# Patient Record
Sex: FEMALE | Race: WHITE | NOT HISPANIC OR LATINO
[De-identification: names, ages, dates, MRNs, and addresses within clinical notes are randomized per-mention and may not be internally consistent; named-entity substitution may affect disease eponyms.]

---

## 2018-05-02 ENCOUNTER — TRANSCRIPTION ENCOUNTER (OUTPATIENT)
Age: 78
End: 2018-05-02

## 2018-05-03 ENCOUNTER — OUTPATIENT (OUTPATIENT)
Dept: OUTPATIENT SERVICES | Facility: HOSPITAL | Age: 78
LOS: 1 days | End: 2018-05-03
Payer: MEDICARE

## 2018-05-03 ENCOUNTER — RESULT REVIEW (OUTPATIENT)
Age: 78
End: 2018-05-03

## 2018-05-03 DIAGNOSIS — R88.8 ABNORMAL FINDINGS IN OTHER BODY FLUIDS AND SUBSTANCES: ICD-10-CM

## 2018-05-03 PROCEDURE — 45380 COLONOSCOPY AND BIOPSY: CPT | Mod: PT

## 2018-05-03 PROCEDURE — 88305 TISSUE EXAM BY PATHOLOGIST: CPT | Mod: 26

## 2018-05-03 PROCEDURE — 88305 TISSUE EXAM BY PATHOLOGIST: CPT

## 2018-05-04 ENCOUNTER — TRANSCRIPTION ENCOUNTER (OUTPATIENT)
Age: 78
End: 2018-05-04

## 2018-05-04 LAB — SURGICAL PATHOLOGY FINAL REPORT - CH: SIGNIFICANT CHANGE UP

## 2022-07-08 ENCOUNTER — EMERGENCY (EMERGENCY)
Facility: HOSPITAL | Age: 82
LOS: 1 days | Discharge: HOME | End: 2022-07-08
Attending: EMERGENCY MEDICINE | Admitting: EMERGENCY MEDICINE
Payer: MEDICARE

## 2022-07-08 VITALS
TEMPERATURE: 98 F | WEIGHT: 145.06 LBS | HEART RATE: 80 BPM | HEIGHT: 65 IN | SYSTOLIC BLOOD PRESSURE: 182 MMHG | OXYGEN SATURATION: 97 % | DIASTOLIC BLOOD PRESSURE: 80 MMHG | RESPIRATION RATE: 17 BRPM

## 2022-07-08 PROCEDURE — 99283 EMERGENCY DEPT VISIT LOW MDM: CPT | Mod: FS

## 2022-07-08 PROCEDURE — 99282 EMERGENCY DEPT VISIT SF MDM: CPT

## 2022-07-08 NOTE — ED PROVIDER NOTE - ATTENDING APP SHARED VISIT CONTRIBUTION OF CARE
Exam revealed elderly white female in NAD with flat, soft and non tender abdomen post reduction of inguinal hernia by PA. I agree with plan and management outlined by PA.

## 2022-07-08 NOTE — ED PROVIDER NOTE - PATIENT PORTAL LINK FT
You can access the FollowMyHealth Patient Portal offered by [facility] by registering at the following website: [email or web address]. By joining [employer]’s FollowMyHealth portal, you will also be able to view your health information using other applications (apps) compatible with our system.

## 2022-07-08 NOTE — ED PROVIDER NOTE - CARE PROVIDER_API CALL
Harry Hicks (MD)  Surgery  [address]  [place] [address]  Phone: [phone]  Fax: [phone]  Follow Up Time:

## 2022-07-08 NOTE — ED PROVIDER NOTE - NSFOLLOWUPINSTRUCTIONS_ED_ALL_ED_FT
Follow up with pcp and surgery  return to er for any worsening symptoms     Inguinal Hernia, Adult       An inguinal hernia is when fat or your intestines push through a weak spot in a muscle where your leg meets your lower belly (groin). This causes a bulge. This kind of hernia could also be:  •In your scrotum, if you are male.      •In folds of skin around your vagina, if you are female.      There are three types of inguinal hernias:  •Hernias that can be pushed back into the belly (are reducible). This type rarely causes pain.      •Hernias that cannot be pushed back into the belly (are incarcerated).      •Hernias that cannot be pushed back into the belly and lose their blood supply (are strangulated). This type needs emergency surgery.        What are the causes?    This condition is caused by having a weak spot in the muscles or tissues in your groin. This develops over time. The hernia may poke through the weak spot when you strain your lower belly muscles all of a sudden, such as when you:  •Lift a heavy object.      •Strain to poop (have a bowel movement). Trouble pooping (constipation) can lead to straining.      •Cough.        What increases the risk?    This condition is more likely to develop in:  •Males.      •Pregnant females.    •People who:  •Are overweight.      •Work in jobs that require long periods of standing or heavy lifting.      •Have had an inguinal hernia before.      •Smoke or have lung disease. These factors can lead to long-term (chronic) coughing.          What are the signs or symptoms?    Symptoms may depend on the size of the hernia. Often, a small hernia has no symptoms. Symptoms of a larger hernia may include:  •A bulge in the groin area. This is easier to see when standing. You might not be able to see it when you are lying down.      •Pain or burning in the groin. This may get worse when you lift, strain, or cough.      •A dull ache or a feeling of pressure in the groin.      •An abnormal bulge in the scrotum, in males.      Symptoms of a strangulated inguinal hernia may include:  •A bulge in your groin that is very painful and tender to the touch.      •A bulge that turns red or purple.      •Fever, feeling like you may vomit (nausea), and vomiting.      •Not being able to poop or to pass gas.        How is this treated?    Treatment depends on the size of your hernia and whether you have symptoms. If you do not have symptoms, your doctor may have you watch your hernia carefully and have you come in for follow-up visits. If your hernia is large or if you have symptoms, you may need surgery to repair the hernia.      Follow these instructions at home:    Lifestyle     •Avoid lifting heavy objects.      •Avoid standing for long amounts of time.      • Do not smoke or use any products that contain nicotine or tobacco. If you need help quitting, ask your doctor.      •Stay at a healthy weight.      Prevent trouble pooping     You may need to take these actions to prevent or treat trouble pooping:  •Drink enough fluid to keep your pee (urine) pale yellow.      •Take over-the-counter or prescription medicines.      •Eat foods that are high in fiber. These include beans, whole grains, and fresh fruits and vegetables.      •Limit foods that are high in fat and sugar. These include fried or sweet foods.      General instructions    •You may try to push your hernia back in place by very gently pressing on it when you are lying down. Do not try to push the bulge back in if it will not go in easily.      •Watch your hernia for any changes in shape, size, or color. Tell your doctor if you see any changes.      •Take over-the-counter and prescription medicines only as told by your doctor.      •Keep all follow-up visits.        Contact a doctor if:    •You have a fever or chills.      •You have new symptoms.      •Your symptoms get worse.        Get help right away if:    •You have pain in your groin that gets worse all of a sudden.    •You have a bulge in your groin that:  •Gets bigger all of a sudden, and it does not get smaller after that.      •Turns red or purple.      •Is painful when you touch it.      •You are a male, and you have:  •Sudden pain in your scrotum.      •A sudden change in the size of your scrotum.        •You cannot push the hernia back in place by very gently pressing on it when you are lying down.      •You feel like you may vomit, and that feeling does not go away.      •You keep vomiting.      •You have a fast heartbeat.      •You cannot poop or pass gas.      These symptoms may be an emergency. Get help right away. Call your local emergency services (911 in the [place]   • Do not wait to see if the symptoms will go away.       • Do not drive yourself to the hospital.         Summary    •An inguinal hernia is when fat or your intestines push through a weak spot in a muscle where your leg meets your lower belly (groin). This causes a bulge.      •If you do not have symptoms, you may not need treatment. If you have symptoms or a large hernia, you may need surgery.      •Avoid lifting heavy objects. Also, avoid standing for long amounts of time.      • Do not try to push the bulge back in if it will not go in easily.      This information is not intended to replace advice given to you by your health care provider. Make sure you discuss any questions you have with your health care provider.      Document Revised: 08/17/2021 Document Reviewed: 08/17/2021    Elsevier Patient Education © 2022 [employer]

## 2022-07-08 NOTE — ED PROVIDER NOTE - OBJECTIVE STATEMENT
Pt is a 81 yo female with pmhx of RA and cervical spine fusion BIBEMS for sudden onset of RLQ pain has known hx of right inguinal hernia denies any nvd fever chills urinary symptoms. Pt denies any heavy lifting.     pcp Nicholas

## 2022-07-08 NOTE — ED ADULT NURSE NOTE - OBJECTIVE STATEMENT
Patient is a 81yo female complaining of right side lower quadrant pain Patient states she has a hernia for many years and was shown how to reduce it. Today it came out and she was not able to reduce it  as easily. Patient states that the hernia is now a lot flatter than this morning. Patient denies fever nausea vomiting diarrhea Patient denies trouble urinating or with bowel movements

## 2022-07-08 NOTE — ED PROVIDER NOTE - NS ED ATTENDING STATEMENT MOD
This was a shared visit with the SHREYAS. I reviewed and verified the documentation and independently performed the documented:

## 2022-07-08 NOTE — ED PROVIDER NOTE - CLINICAL SUMMARY MEDICAL DECISION MAKING FREE TEXT BOX
81 yo white female with inguinal hernia, popped out again today and unable to reduce it herself so presented here for further evaluation and management. This case will require evaluation and reduction or hernia.

## 2022-07-08 NOTE — ED PROVIDER NOTE - CPE EDP MUSC NORM
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

## 2022-07-08 NOTE — ED ADULT NURSE NOTE - NSIMPLEMENTINTERV_GEN_ALL_ED
Implemented All Fall with Harm Risk Interventions:  [place] to call system. Call bell, personal items and telephone within reach. Instruct patient to call for assistance. Room bathroom lighting operational. Non-slip footwear when patient is off stretcher. Physically safe environment: no spills, clutter or unnecessary equipment. Stretcher in lowest position, wheels locked, appropriate side rails in place. Provide visual cue, wrist band, yellow gown, etc. Monitor gait and stability. Monitor for mental status changes and reorient to person, place, and time. Review medications for side effects contributing to fall risk. Reinforce activity limits and safety measures with patient and family. Provide visual clues: red socks.

## 2022-07-08 NOTE — ED PROVIDER NOTE - NSICDXPASTMEDICALHX_GEN_ALL_CORE_FT
PAST MEDICAL HISTORY:  Closed Fracture of Epiphysis of Neck of Femur (ICD9 820.01)     Rheumatoid Arthritis (ICD9 714.0)

## 2023-06-05 ENCOUNTER — APPOINTMENT (OUTPATIENT)
Dept: SURGERY | Facility: CLINIC | Age: 83
End: 2023-06-05

## 2023-06-08 ENCOUNTER — APPOINTMENT (OUTPATIENT)
Dept: SURGERY | Facility: CLINIC | Age: 83
End: 2023-06-08
Payer: MEDICARE

## 2023-06-08 VITALS
OXYGEN SATURATION: 98 % | SYSTOLIC BLOOD PRESSURE: 174 MMHG | HEART RATE: 56 BPM | BODY MASS INDEX: 23.32 KG/M2 | HEIGHT: 65 IN | DIASTOLIC BLOOD PRESSURE: 84 MMHG | WEIGHT: 140 LBS

## 2023-06-08 VITALS — TEMPERATURE: 98.3 F

## 2023-06-08 DIAGNOSIS — Z87.81 PERSONAL HISTORY OF (HEALED) TRAUMATIC FRACTURE: ICD-10-CM

## 2023-06-08 DIAGNOSIS — Z80.3 FAMILY HISTORY OF MALIGNANT NEOPLASM OF BREAST: ICD-10-CM

## 2023-06-08 DIAGNOSIS — Z78.9 OTHER SPECIFIED HEALTH STATUS: ICD-10-CM

## 2023-06-08 DIAGNOSIS — Z87.39 PERSONAL HISTORY OF OTHER DISEASES OF THE MUSCULOSKELETAL SYSTEM AND CONNECTIVE TISSUE: ICD-10-CM

## 2023-06-08 DIAGNOSIS — K40.30 UNILATERAL INGUINAL HERNIA, WITH OBSTRUCTION, W/OUT GANGRENE, NOT SPECIFIED AS RECURRENT: ICD-10-CM

## 2023-06-08 DIAGNOSIS — Z86.79 PERSONAL HISTORY OF OTHER DISEASES OF THE CIRCULATORY SYSTEM: ICD-10-CM

## 2023-06-08 PROCEDURE — 99203 OFFICE O/P NEW LOW 30 MIN: CPT

## 2023-06-08 RX ORDER — LOSARTAN POTASSIUM 100 MG/1
100 TABLET, FILM COATED ORAL
Qty: 90 | Refills: 0 | Status: ACTIVE | COMMUNITY
Start: 2023-03-30

## 2023-06-08 RX ORDER — METHOTREXATE 2.5 MG/1
2.5 TABLET ORAL
Qty: 48 | Refills: 0 | Status: ACTIVE | COMMUNITY
Start: 2022-11-03

## 2023-06-08 RX ORDER — FOLIC ACID 1 MG/1
1 TABLET ORAL
Qty: 90 | Refills: 0 | Status: ACTIVE | COMMUNITY
Start: 2022-07-20

## 2023-06-08 NOTE — REVIEW OF SYSTEMS
[Arthralgias] : arthralgias [Joint Swelling] : joint swelling [Joint Stiffness] : joint stiffness [Limb Pain] : limb pain [Limb Swelling] : limb swelling [Easy Bleeding] : no tendency for easy bleeding [Easy Bruising] : a tendency for easy bruising [Swollen Glands] : no swollen glands [Swollen Glands In The Neck] : no swollen glands in the neck [Negative] : Endocrine [FreeTextEntry8] : increased urination

## 2023-06-08 NOTE — ASSESSMENT
[FreeTextEntry1] : I have discussed with pt the signs and symptoms of incarceration and strangulation and the importance of calling immediately should they develop.\par I have discussed with pt the risks ( including her elevated risks), benefits and options. Pt would like to treat her hernia conservatively,

## 2023-06-08 NOTE — HISTORY OF PRESENT ILLNESS
[de-identified] : right inguinal hernia [de-identified] : 82 year old white female with severe RA on Remicade and Methotrexate who is s/p a fall with a fractured neck and now is wheel chair bound who presents c/o right groin swelling. Pt states she has had the swelling for over a year. It does not cause her pain and she can "push it in .' She denies any nausea or vomiting or changes in her bowel habits.

## 2023-06-08 NOTE — PHYSICAL EXAM
[JVD] : no jugular venous distention  [Carotid Bruits] : no carotid bruits [Normal Breath Sounds] : Normal breath sounds [Normal Heart Sounds] : normal heart sounds [No Rash or Lesion] : No rash or lesion [Alert] : alert [Oriented to Person] : oriented to person [Oriented to Place] : oriented to place [Oriented to Time] : oriented to time [Calm] : calm [de-identified] : well developed white female in no distress [de-identified] : nonicteric [de-identified] : without adenopathy, limited mobility [de-identified] : normal bowel sounds, without distension or tenderness [de-identified] : soft, reducible RIH, left side normal  [de-identified] : without calf pain or swelling

## 2023-07-08 ENCOUNTER — INPATIENT (INPATIENT)
Facility: HOSPITAL | Age: 83
LOS: 5 days | Discharge: NURSING, REHAB OR LONG-TERM CARE | DRG: 689 | End: 2023-07-14
Attending: INTERNAL MEDICINE | Admitting: STUDENT IN AN ORGANIZED HEALTH CARE EDUCATION/TRAINING PROGRAM
Payer: MEDICARE

## 2023-07-08 VITALS
DIASTOLIC BLOOD PRESSURE: 68 MMHG | TEMPERATURE: 99 F | HEIGHT: 65 IN | RESPIRATION RATE: 16 BRPM | HEART RATE: 84 BPM | SYSTOLIC BLOOD PRESSURE: 119 MMHG | WEIGHT: 145.06 LBS | OXYGEN SATURATION: 95 %

## 2023-07-08 DIAGNOSIS — N39.0 URINARY TRACT INFECTION, SITE NOT SPECIFIED: ICD-10-CM

## 2023-07-08 DIAGNOSIS — Z98.890 OTHER SPECIFIED POSTPROCEDURAL STATES: Chronic | ICD-10-CM

## 2023-07-08 DIAGNOSIS — Z87.81 PERSONAL HISTORY OF (HEALED) TRAUMATIC FRACTURE: Chronic | ICD-10-CM

## 2023-07-08 LAB
ALBUMIN SERPL ELPH-MCNC: 3 G/DL — LOW (ref 3.3–5)
ALP SERPL-CCNC: 63 U/L — SIGNIFICANT CHANGE UP (ref 40–120)
ALT FLD-CCNC: 24 U/L — SIGNIFICANT CHANGE UP (ref 12–78)
ANION GAP SERPL CALC-SCNC: 8 MMOL/L — SIGNIFICANT CHANGE UP (ref 5–17)
APPEARANCE UR: ABNORMAL
AST SERPL-CCNC: 32 U/L — SIGNIFICANT CHANGE UP (ref 15–37)
BASOPHILS # BLD AUTO: 0 K/UL — SIGNIFICANT CHANGE UP (ref 0–0.2)
BASOPHILS NFR BLD AUTO: 0 % — SIGNIFICANT CHANGE UP (ref 0–2)
BILIRUB SERPL-MCNC: 0.8 MG/DL — SIGNIFICANT CHANGE UP (ref 0.2–1.2)
BILIRUB UR-MCNC: NEGATIVE — SIGNIFICANT CHANGE UP
BUN SERPL-MCNC: 30 MG/DL — HIGH (ref 7–23)
CALCIUM SERPL-MCNC: 9 MG/DL — SIGNIFICANT CHANGE UP (ref 8.5–10.1)
CHLORIDE SERPL-SCNC: 105 MMOL/L — SIGNIFICANT CHANGE UP (ref 96–108)
CO2 SERPL-SCNC: 25 MMOL/L — SIGNIFICANT CHANGE UP (ref 22–31)
COLOR SPEC: YELLOW — SIGNIFICANT CHANGE UP
CREAT SERPL-MCNC: 1.3 MG/DL — SIGNIFICANT CHANGE UP (ref 0.5–1.3)
DIFF PNL FLD: ABNORMAL
EGFR: 41 ML/MIN/1.73M2 — LOW
EOSINOPHIL # BLD AUTO: 0 K/UL — SIGNIFICANT CHANGE UP (ref 0–0.5)
EOSINOPHIL NFR BLD AUTO: 0 % — SIGNIFICANT CHANGE UP (ref 0–6)
GLUCOSE SERPL-MCNC: 119 MG/DL — HIGH (ref 70–99)
GLUCOSE UR QL: NEGATIVE MG/DL — SIGNIFICANT CHANGE UP
HCT VFR BLD CALC: 32.8 % — LOW (ref 34.5–45)
HGB BLD-MCNC: 11 G/DL — LOW (ref 11.5–15.5)
KETONES UR-MCNC: NEGATIVE MG/DL — SIGNIFICANT CHANGE UP
LACTATE SERPL-SCNC: 0.7 MMOL/L — SIGNIFICANT CHANGE UP (ref 0.7–2)
LEUKOCYTE ESTERASE UR-ACNC: ABNORMAL
LYMPHOCYTES # BLD AUTO: 0.58 K/UL — LOW (ref 1–3.3)
LYMPHOCYTES # BLD AUTO: 4 % — LOW (ref 13–44)
MCHC RBC-ENTMCNC: 33.5 GM/DL — SIGNIFICANT CHANGE UP (ref 32–36)
MCHC RBC-ENTMCNC: 34.5 PG — HIGH (ref 27–34)
MCV RBC AUTO: 102.8 FL — HIGH (ref 80–100)
MONOCYTES # BLD AUTO: 0.44 K/UL — SIGNIFICANT CHANGE UP (ref 0–0.9)
MONOCYTES NFR BLD AUTO: 3 % — SIGNIFICANT CHANGE UP (ref 2–14)
NEUTROPHILS # BLD AUTO: 13.51 K/UL — HIGH (ref 1.8–7.4)
NEUTROPHILS NFR BLD AUTO: 91 % — HIGH (ref 43–77)
NITRITE UR-MCNC: POSITIVE
NRBC # BLD: SIGNIFICANT CHANGE UP /100 WBCS (ref 0–0)
PH UR: 5.5 — SIGNIFICANT CHANGE UP (ref 5–8)
PLATELET # BLD AUTO: 225 K/UL — SIGNIFICANT CHANGE UP (ref 150–400)
POTASSIUM SERPL-MCNC: 3.3 MMOL/L — LOW (ref 3.5–5.3)
POTASSIUM SERPL-SCNC: 3.3 MMOL/L — LOW (ref 3.5–5.3)
PROT SERPL-MCNC: 7.1 G/DL — SIGNIFICANT CHANGE UP (ref 6–8.3)
PROT UR-MCNC: 100 MG/DL
RBC # BLD: 3.19 M/UL — LOW (ref 3.8–5.2)
RBC # FLD: 14.9 % — HIGH (ref 10.3–14.5)
SODIUM SERPL-SCNC: 138 MMOL/L — SIGNIFICANT CHANGE UP (ref 135–145)
SP GR SPEC: 1.01 — SIGNIFICANT CHANGE UP (ref 1–1.03)
UROBILINOGEN FLD QL: 1 MG/DL — SIGNIFICANT CHANGE UP (ref 0.2–1)
WBC # BLD: 14.53 K/UL — HIGH (ref 3.8–10.5)
WBC # FLD AUTO: 14.53 K/UL — HIGH (ref 3.8–10.5)

## 2023-07-08 PROCEDURE — 99285 EMERGENCY DEPT VISIT HI MDM: CPT | Mod: FS

## 2023-07-08 PROCEDURE — 93010 ELECTROCARDIOGRAM REPORT: CPT

## 2023-07-08 PROCEDURE — 99223 1ST HOSP IP/OBS HIGH 75: CPT | Mod: GC

## 2023-07-08 PROCEDURE — 74176 CT ABD & PELVIS W/O CONTRAST: CPT | Mod: 26,MA

## 2023-07-08 PROCEDURE — 71045 X-RAY EXAM CHEST 1 VIEW: CPT | Mod: 26

## 2023-07-08 RX ORDER — POTASSIUM CHLORIDE 20 MEQ
40 PACKET (EA) ORAL ONCE
Refills: 0 | Status: COMPLETED | OUTPATIENT
Start: 2023-07-08 | End: 2023-07-08

## 2023-07-08 RX ORDER — CEFTRIAXONE 500 MG/1
1000 INJECTION, POWDER, FOR SOLUTION INTRAMUSCULAR; INTRAVENOUS ONCE
Refills: 0 | Status: COMPLETED | OUTPATIENT
Start: 2023-07-08 | End: 2023-07-08

## 2023-07-08 RX ORDER — ACETAMINOPHEN 500 MG
1000 TABLET ORAL ONCE
Refills: 0 | Status: COMPLETED | OUTPATIENT
Start: 2023-07-08 | End: 2023-07-08

## 2023-07-08 RX ORDER — ACETAMINOPHEN 500 MG
650 TABLET ORAL ONCE
Refills: 0 | Status: COMPLETED | OUTPATIENT
Start: 2023-07-08 | End: 2023-07-08

## 2023-07-08 RX ORDER — SODIUM CHLORIDE 9 MG/ML
1000 INJECTION INTRAMUSCULAR; INTRAVENOUS; SUBCUTANEOUS ONCE
Refills: 0 | Status: COMPLETED | OUTPATIENT
Start: 2023-07-08 | End: 2023-07-08

## 2023-07-08 RX ADMIN — CEFTRIAXONE 100 MILLIGRAM(S): 500 INJECTION, POWDER, FOR SOLUTION INTRAMUSCULAR; INTRAVENOUS at 21:30

## 2023-07-08 RX ADMIN — SODIUM CHLORIDE 1000 MILLILITER(S): 9 INJECTION INTRAMUSCULAR; INTRAVENOUS; SUBCUTANEOUS at 19:33

## 2023-07-08 RX ADMIN — Medication 400 MILLIGRAM(S): at 19:34

## 2023-07-08 RX ADMIN — Medication 40 MILLIEQUIVALENT(S): at 23:50

## 2023-07-08 RX ADMIN — Medication 1000 MILLIGRAM(S): at 20:04

## 2023-07-08 RX ADMIN — Medication 650 MILLIGRAM(S): at 23:50

## 2023-07-08 NOTE — ED ADULT NURSE NOTE - NSFALLHARMRISKINTERV_ED_ALL_ED
Assistance OOB with selected safe patient handling equipment if applicable/Assistance with ambulation/Communicate risk of Fall with Harm to all staff, patient, and family/Monitor gait and stability/Provide patient with walking aids/Provide visual cue: red socks, yellow wristband, yellow gown, etc/Reinforce activity limits and safety measures with patient and family/Toileting schedule using arm’s reach rule for commode and bathroom/Bed in lowest position, wheels locked, appropriate side rails in place/Call bell, personal items and telephone in reach/Instruct patient to call for assistance before getting out of bed/chair/stretcher/Non-slip footwear applied when patient is off stretcher/[place] to call system/Physically safe environment - no spills, clutter or unnecessary equipment/Purposeful Proactive Rounding/Room/bathroom lighting operational, light cord in reach

## 2023-07-08 NOTE — ED ADULT NURSE NOTE - NSICDXPASTMEDICALHX_GEN_ALL_CORE_FT
PAST MEDICAL HISTORY:  Closed Fracture of Epiphysis of Neck of Femur (ICD9 820.01)     HTN (hypertension)     Rheumatoid Arthritis (ICD9 714.0)     Spinal cord injury, cervical region

## 2023-07-08 NOTE — ED PROVIDER NOTE - OBJECTIVE STATEMENT
Patient is a 83-year-old female with past medical history of RA on Remicade cervical spines fusion coming in complaining of fever since yesterday.  Patient states she felt like she was getting a UTI because her urine had a foul order so called PCP and started on Macrobid took 1 dose today.  Patient daughter felt like patient was getting more generalized weakness having shakes and chills so brought to emergency room.  Patient denies any abdominal pain flank pain nausea vomiting dysuria or hematuria.  Patient denies any history of kidney stones. Patient is a 83-year-old female with past medical history of RA on Remicade cervical spines fusion coming in complaining of fever since yesterday.  Patient states she felt like she was getting a UTI because her urine had a foul order so called PCP and started on Macrobid took 1 dose today.  Patient daughter felt like patient was getting more generalized weakness having shakes and chills so brought to emergency room.  Patient denies any abdominal pain flank pain nausea vomiting dysuria or hematuria.  Patient denies any history of kidney stones. Pt was unable to get up from toilet seat due to weakness.    pcp maria a

## 2023-07-08 NOTE — H&P ADULT - HISTORY OF PRESENT ILLNESS
83y female with PMHx of RA on Remicade, HTN, spinal cord injury s/p cervical fusion (2000), OA presented to the ED for the evaluation of fever (Tmax 103F) at home. Admits to associated chills and increased urinary frequency.    In the ED,  Vital Signs T(F): Max: 99.1 HR: 84 BP: 119/68 RR: 16 SpO2: 95%  Labs significant for: WBC 14.53, H/H 11.0/32.8, K 3.3, BUN/Cr 30/1.30  UA grossly positive with +nitritie, large LE, 78 WBC, many bacteria  CT renal stone hunt: Limited exam secondary to streak artifact in the pelvis and lack of intravenous contrast. There are 4 nonobstructing right renal calcifications the largest measuring 11 mm. Mild right hydronephrosis. The urinary base of bladder obscured by streak artifact therefore cannot assess for an obstructing calculus.  CXR: no acute lobar PNA on personal review  EKG: **** 83y female with PMHx of RA on remicade, HTN, spinal cord injury s/p cervical fusion (2000), OA presented to the ED for the evaluation of fever (Tmax 103F) at home. Admits to associated chills and increased urinary frequency. Patient states that for the last 3 days she has been having intermittent fevers (highest being 102-103F). Patient has also been having chills, increased urinary frequency, and has progressively been feeling weak overall. Patient states that her daughter contacted her PCP in her behalf in which she was prescribed macrobid and took one dose yesterday. Patient did not visit the PCP in person but talked to a covering PCP over the phone. Patient was brought into the ED as patient was increasingly weak and her urinary frequency was progressively getting worse. Patient denies chest pain, palpitations, sob, abdominal pain, n/v, dysuria, and back pain.     In the ED,  Vital Signs T(F): Max: 99.1 HR: 84 BP: 119/68 RR: 16 SpO2: 95%  Labs significant for: WBC 14.53, H/H 11.0/32.8, K 3.3, BUN/Cr 30/1.30  UA grossly positive with nitrite large LE, 78 WBC, many bacteria  CT renal stone hunt: Limited exam secondary to streak artifact in the pelvis and lack of intravenous contrast. There are 4 non obstructing right renal calcifications the largest measuring 11 mm. Mild right hydronephrosis. The urinary base of bladder obscured by streak artifact therefore cannot assess for an obstructing calculus.  CXR: no acute lobar PNA on personal review  EKG: NSR, LVH, VR 73bpm  83y female with PMHx of RA, HTN, spinal cord injury s/p cervical fusion (2000), OA presented to the ED for the evaluation of fever (Tmax 103F) at home. Admits to associated chills and increased urinary frequency. Patient states that for the last 3 days she has been having intermittent fevers (highest being 102-103F). Patient has also been having chills, increased urinary frequency, and has progressively been feeling weak overall. Patient states that her daughter contacted her PCP in her behalf in which she was prescribed macrobid and took one dose yesterday. Patient did not visit the PCP in person but talked to a covering PCP over the phone. Patient was brought into the ED as patient was increasingly weak and her urinary frequency was progressively getting worse. Patient denies chest pain, palpitations, sob, abdominal pain, n/v, dysuria, and back pain.     In the ED,  Vital Signs T(F): Max: 99.1 HR: 84 BP: 119/68 RR: 16 SpO2: 95%  Labs significant for: WBC 14.53, H/H 11.0/32.8, K 3.3, BUN/Cr 30/1.30  UA grossly positive with nitrite large LE, 78 WBC, many bacteria  CT renal stone hunt: Limited exam secondary to streak artifact in the pelvis and lack of intravenous contrast. There are 4 non obstructing right renal calcifications the largest measuring 11 mm. Mild right hydronephrosis. The urinary base of bladder obscured by streak artifact therefore cannot assess for an obstructing calculus.  CXR: no acute lobar PNA on personal review  EKG: NSR, LVH, VR 73bpm

## 2023-07-08 NOTE — H&P ADULT - NSHPPHYSICALEXAM_GEN_ALL_CORE
T(C): 37.3 (07-08-23 @ 18:18), Max: 37.3 (07-08-23 @ 18:18)  HR: 84 (07-08-23 @ 18:18) (84 - 84)  BP: 119/68 (07-08-23 @ 18:18) (119/68 - 119/68)  RR: 16 (07-08-23 @ 18:18) (16 - 16)  SpO2: 95% (07-08-23 @ 18:18) (95% - 95%) T(C): 37.3 (07-08-23 @ 18:18), Max: 37.3 (07-08-23 @ 18:18)  HR: 84 (07-08-23 @ 18:18) (84 - 84)  BP: 119/68 (07-08-23 @ 18:18) (119/68 - 119/68)  RR: 16 (07-08-23 @ 18:18) (16 - 16)  SpO2: 95% (07-08-23 @ 18:18) (95% - 95%)    CONSTITUTIONAL: Well groomed, no apparent distress  EYES: No conjunctival or scleral injection, non-icteric  ENMT: Oral mucosa with moist membranes.   NECK: Supple, symmetric and without tracheal deviation   RESP: No respiratory distress, no use of accessory muscles; CTA b/l, no WRR  CV: RRR, +S1S2, no peripheral edema  GI: Soft, NT, ND  MSK: Normal ROM without pain, Bilateral knee pain   SKIN: No rashes or ulcers noted  NEURO: Sensation intact in upper and lower extremities b/l to light touch   PSYCH: Appropriate insight/judgment; A+O x 3, mood and affect appropriate

## 2023-07-08 NOTE — ED PROVIDER NOTE - CLINICAL SUMMARY MEDICAL DECISION MAKING FREE TEXT BOX
84-year-old female with past medical history of rheumatoid arthritis, on Remicade every 8 weeks, due for her next dose this coming Wednesday, presents to the emergency department by ambulance with report of weakness, patient states that she developed a fever yesterday Tmax of 103, associated with chills, and frequent urination, patient called primary care and through telemetry visit was prescribed Macrobid which she took 1 dose for the first time today, patient sat on toilet today and was unable to get up due to increased weakness of lower extremities, daughter called for ambulance, patient awake and alert, no respiratory distress, abdomen soft nontender, will follow-up CBC, CMP, blood cultures, UA urine culture, chest x-ray, CT renal, start IV fluids, antipyretics, start antibiotics admit.

## 2023-07-08 NOTE — ED ADULT TRIAGE NOTE - IDEAL BODY WEIGHT(KG)
At [facility], one important tool we use to improve our patient services is our Patient Survey.  Following your visit you may receive our survey in the mail.    Please take the time to complete the survey.    If your visit with us was great, we want to hear about it.    If we can improve, please let us know how.         
57

## 2023-07-08 NOTE — H&P ADULT - ASSESSMENT
83y female with PMHx of RA on Remicade, HTN, spinal cord injury s/p cervical fusion (2000), OA admitted with acute UTI.    #Acute UTI  - Admit to medicine  - Continue rocephin 1g q24  - Suspect sepsis is evolving on admission  - Follow up blood and urine cultures  - ID consult Dr. Trevizo    #RA    #HTN    #Cervical fusion    #Need for prophylactic measure  - VTE prophylaxis with subcutaneous lovenox 83y female with PMHx of RA on Remicade, HTN, spinal cord injury s/p cervical fusion (2000), OA admitted with acute UTI.    #Acute UTI  - Admit to medicine  - UA grossly positive with nitrite large LE, 78 WBC, many bacteria  - CT renal stone hunt: Limited exam secondary to streak artifact in the pelvis and lack of intravenous contrast. There are 4 non obstructing right renal calcifications the largest measuring 11 mm. Mild right hydronephrosis. The urinary base of bladder obscured by streak artifact therefore cannot assess for an obstructing calculus.  - Leukocytosis on labs, febrile   - Given IV Rocephin x1 dose, IV NS 1 L bolus in ED  - Continue rocephin 1g q24  - Suspect sepsis is evolving on admission  - Will give gentle IVF @75cc/hr x 12 hours (no reported hx of CHF and no TTE)   - Follow up blood and urine cultures  - Tylenol 650 mg PO q6h PRN fever or mild pain  - Trend WBC and monitor for fever  - ID consult Dr. Trevizo    #RA  - Continue home methotrexate   - Tylenol and voltaren gel PRN for pain     #HTN  - /68 on admission   - Continue home losartan with hold parameters     #Cervical fusion  - S/p cervical fusion in 2000  - No neck pain currently     #Need for prophylactic measure  - VTE prophylaxis with subcutaneous lovenox    #Dispo  - PT consulted, will appreciate recs  83y female with PMHx of RA, HTN, spinal cord injury s/p cervical fusion (2000), OA admitted with acute UTI.    #Acute UTI  #Nephrolithiasis  - Admit to medicine  - UA grossly positive with nitrite large LE, 78 WBC, many bacteria  - CT renal stone hunt: Limited exam secondary to streak artifact in the pelvis and lack of intravenous contrast. There are 4 non obstructing right renal calcifications the largest measuring 11 mm. Mild right hydronephrosis. The urinary base of bladder obscured by streak artifact therefore cannot assess for an obstructing calculus.  - Leukocytosis on labs, febrile   - Given IV Rocephin x1 dose, IV NS 1 L bolus in ED  - Continue rocephin 1g q24  - Suspect sepsis is evolving on admission  - Will give gentle IVF @75cc/hr x 12 hours (no reported hx of CHF and no TTE)   - Follow up blood and urine cultures  - Tylenol 650 mg PO q6h PRN fever or mild pain  - Trend WBC and monitor for fever  - ID consult Dr. Trevizo    #RA  - Continue home methotrexate   - Tylenol and voltaren gel PRN for pain     #HTN  - /68 on admission   - Continue home losartan with hold parameters     #Cervical fusion  - S/p cervical fusion in 2000  - No neck pain currently     #Need for prophylactic measure  - VTE prophylaxis with subcutaneous lovenox    #Dispo  - PT consulted, will appreciate recs

## 2023-07-08 NOTE — H&P ADULT - NSHPREVIEWOFSYSTEMS_GEN_ALL_CORE
REVIEW OF SYSTEMS:  CONSTITUTIONAL: + Fever/chills, No appetite changes.  HENMT: No HA, lightheadedness/dizziness  RESPIRATORY: No cough, wheezing, hemoptysis; No shortness of breath.  CARDIOVASCULAR: No chest pain, palpitations.  GASTROINTESTINAL: No abdominal or epigastric pain. No nausea or vomiting; No diarrhea or constipation.  GENITOURINARY: + Urinary frequency, No dysuria, hematuria, or incontinence  NEUROLOGICAL: Baseline strength. Sensation intact bilaterally.  SKIN: No itching, rashes  MUSCULOSKELETAL: + Bilateral knee pain, no joint swelling

## 2023-07-08 NOTE — ED ADULT TRIAGE NOTE - CHIEF COMPLAINT QUOTE
Patient is a 83yio female biba  complaining of lower extremity weakness and fever x 3 days with a diagnoses UTI Patient is on antibiotic Patient has pmh RA

## 2023-07-08 NOTE — H&P ADULT - NSHPSOCIALHISTORY_GEN_ALL_CORE
, lives at home. Former smoker, quit 15 years ago. Social EtOH. No illicit drug use. Ambulates with assistancy , lives at home. Former smoker, quit 15 years ago. Social EtOH (drinks wine on weekends) . No illicit drug use. Ambulates with walker .

## 2023-07-08 NOTE — H&P ADULT - ATTENDING COMMENTS
83y female with PMHx of RA, HTN, spinal cord injury s/p cervical fusion (2000), OA admitted with acute UTI. Not meeting sepsis criteria on admission, but suspect that sepsis is evolving. She had high fevers at home. Continue IV rocephin. Follow up blood and urine cultures. ID consult Dr. Trevizo. [facility] hospitalist Dr. Simmons to follow in AM. Patient resting comfortably, seen at bedside.    Agree with H&P as outlined above, edited where appropriate.

## 2023-07-08 NOTE — ED ADULT NURSE NOTE - OBJECTIVE STATEMENT
Pt reports having UTI symptoms, called PMD, started one dose of macrobid, experiencing increased weakness 24 hours, difficulty ambulating

## 2023-07-08 NOTE — ED ADULT NURSE NOTE - NS ED NURSE REPORT GIVEN TO FT
Patient does not appear to be in any acute distress/shows no evidence of clinical instability at this time. Provider has reviewed discharge instructions with the patient/family. The patient/family verbalized understanding instructions as well as need for follow up for any further symptoms. Discharge papers given, education provided, and any questions answered. Anni GAMBOA

## 2023-07-09 LAB
A1C WITH ESTIMATED AVERAGE GLUCOSE RESULT: 4.8 % — SIGNIFICANT CHANGE UP (ref 4–5.6)
ALBUMIN SERPL ELPH-MCNC: 2.6 G/DL — LOW (ref 3.3–5)
ALP SERPL-CCNC: 55 U/L — SIGNIFICANT CHANGE UP (ref 40–120)
ALT FLD-CCNC: 24 U/L — SIGNIFICANT CHANGE UP (ref 12–78)
ANION GAP SERPL CALC-SCNC: 3 MMOL/L — LOW (ref 5–17)
AST SERPL-CCNC: 33 U/L — SIGNIFICANT CHANGE UP (ref 15–37)
BASOPHILS # BLD AUTO: 0.04 K/UL — SIGNIFICANT CHANGE UP (ref 0–0.2)
BASOPHILS NFR BLD AUTO: 0.4 % — SIGNIFICANT CHANGE UP (ref 0–2)
BILIRUB SERPL-MCNC: 0.5 MG/DL — SIGNIFICANT CHANGE UP (ref 0.2–1.2)
BUN SERPL-MCNC: 28 MG/DL — HIGH (ref 7–23)
CALCIUM SERPL-MCNC: 8.8 MG/DL — SIGNIFICANT CHANGE UP (ref 8.5–10.1)
CHLORIDE SERPL-SCNC: 110 MMOL/L — HIGH (ref 96–108)
CO2 SERPL-SCNC: 26 MMOL/L — SIGNIFICANT CHANGE UP (ref 22–31)
CREAT SERPL-MCNC: 1.1 MG/DL — SIGNIFICANT CHANGE UP (ref 0.5–1.3)
EGFR: 50 ML/MIN/1.73M2 — LOW
EOSINOPHIL # BLD AUTO: 0.02 K/UL — SIGNIFICANT CHANGE UP (ref 0–0.5)
EOSINOPHIL NFR BLD AUTO: 0.2 % — SIGNIFICANT CHANGE UP (ref 0–6)
ESTIMATED AVERAGE GLUCOSE: 91 MG/DL — SIGNIFICANT CHANGE UP (ref 68–114)
GLUCOSE SERPL-MCNC: 98 MG/DL — SIGNIFICANT CHANGE UP (ref 70–99)
HCT VFR BLD CALC: 31.6 % — LOW (ref 34.5–45)
HGB BLD-MCNC: 10.5 G/DL — LOW (ref 11.5–15.5)
IMM GRANULOCYTES NFR BLD AUTO: 0.8 % — SIGNIFICANT CHANGE UP (ref 0–0.9)
LYMPHOCYTES # BLD AUTO: 0.65 K/UL — LOW (ref 1–3.3)
LYMPHOCYTES # BLD AUTO: 6.1 % — LOW (ref 13–44)
MCHC RBC-ENTMCNC: 33.2 GM/DL — SIGNIFICANT CHANGE UP (ref 32–36)
MCHC RBC-ENTMCNC: 34.1 PG — HIGH (ref 27–34)
MCV RBC AUTO: 102.6 FL — HIGH (ref 80–100)
MONOCYTES # BLD AUTO: 0.35 K/UL — SIGNIFICANT CHANGE UP (ref 0–0.9)
MONOCYTES NFR BLD AUTO: 3.3 % — SIGNIFICANT CHANGE UP (ref 2–14)
NEUTROPHILS # BLD AUTO: 9.5 K/UL — HIGH (ref 1.8–7.4)
NEUTROPHILS NFR BLD AUTO: 89.2 % — HIGH (ref 43–77)
NRBC # BLD: 0 /100 WBCS — SIGNIFICANT CHANGE UP (ref 0–0)
PLATELET # BLD AUTO: 197 K/UL — SIGNIFICANT CHANGE UP (ref 150–400)
POTASSIUM SERPL-MCNC: 4.1 MMOL/L — SIGNIFICANT CHANGE UP (ref 3.5–5.3)
POTASSIUM SERPL-SCNC: 4.1 MMOL/L — SIGNIFICANT CHANGE UP (ref 3.5–5.3)
PROT SERPL-MCNC: 6.1 G/DL — SIGNIFICANT CHANGE UP (ref 6–8.3)
RBC # BLD: 3.08 M/UL — LOW (ref 3.8–5.2)
RBC # FLD: 14.9 % — HIGH (ref 10.3–14.5)
SODIUM SERPL-SCNC: 139 MMOL/L — SIGNIFICANT CHANGE UP (ref 135–145)
WBC # BLD: 10.65 K/UL — HIGH (ref 3.8–10.5)
WBC # FLD AUTO: 10.65 K/UL — HIGH (ref 3.8–10.5)

## 2023-07-09 RX ORDER — METHOTREXATE 2.5 MG/1
0 TABLET ORAL
Refills: 0 | DISCHARGE

## 2023-07-09 RX ORDER — ENOXAPARIN SODIUM 100 MG/ML
40 INJECTION SUBCUTANEOUS EVERY 24 HOURS
Refills: 0 | Status: DISCONTINUED | OUTPATIENT
Start: 2023-07-09 | End: 2023-07-14

## 2023-07-09 RX ORDER — METHOTREXATE 2.5 MG/1
2.5 TABLET ORAL
Refills: 0 | Status: DISCONTINUED | OUTPATIENT
Start: 2023-07-09 | End: 2023-07-14

## 2023-07-09 RX ORDER — LOSARTAN POTASSIUM 100 MG/1
100 TABLET, FILM COATED ORAL DAILY
Refills: 0 | Status: DISCONTINUED | OUTPATIENT
Start: 2023-07-09 | End: 2023-07-14

## 2023-07-09 RX ORDER — ACETAMINOPHEN 500 MG
650 TABLET ORAL EVERY 6 HOURS
Refills: 0 | Status: DISCONTINUED | OUTPATIENT
Start: 2023-07-09 | End: 2023-07-14

## 2023-07-09 RX ORDER — DICLOFENAC SODIUM 30 MG/G
4 GEL TOPICAL
Refills: 0 | Status: DISCONTINUED | OUTPATIENT
Start: 2023-07-09 | End: 2023-07-14

## 2023-07-09 RX ORDER — LOSARTAN POTASSIUM 100 MG/1
1 TABLET, FILM COATED ORAL
Refills: 0 | DISCHARGE

## 2023-07-09 RX ORDER — SODIUM CHLORIDE 9 MG/ML
1000 INJECTION INTRAMUSCULAR; INTRAVENOUS; SUBCUTANEOUS
Refills: 0 | Status: DISCONTINUED | OUTPATIENT
Start: 2023-07-09 | End: 2023-07-14

## 2023-07-09 RX ORDER — CEFTRIAXONE 500 MG/1
1000 INJECTION, POWDER, FOR SOLUTION INTRAMUSCULAR; INTRAVENOUS EVERY 24 HOURS
Refills: 0 | Status: DISCONTINUED | OUTPATIENT
Start: 2023-07-09 | End: 2023-07-12

## 2023-07-09 RX ADMIN — SODIUM CHLORIDE 75 MILLILITER(S): 9 INJECTION INTRAMUSCULAR; INTRAVENOUS; SUBCUTANEOUS at 03:24

## 2023-07-09 RX ADMIN — LOSARTAN POTASSIUM 100 MILLIGRAM(S): 100 TABLET, FILM COATED ORAL at 06:58

## 2023-07-09 RX ADMIN — CEFTRIAXONE 100 MILLIGRAM(S): 500 INJECTION, POWDER, FOR SOLUTION INTRAMUSCULAR; INTRAVENOUS at 03:24

## 2023-07-09 RX ADMIN — ENOXAPARIN SODIUM 40 MILLIGRAM(S): 100 INJECTION SUBCUTANEOUS at 03:26

## 2023-07-09 NOTE — CARE COORDINATION ASSESSMENT. - NSCAREPROVIDERS_GEN_ALL_CORE_FT
CARE PROVIDERS:  Accepting Physician: Tyler Chairez  Administration: Yuriy Alfred  Administration: Vanessa Begum  Admitting: Tyler Chairez  Attending: Tyler Chairez  Case Management: Nerissa Nichole  ED ACP: Louis Sarmiento  ED Attending: Devin Erickson  ED Nurse: Laura Schuster  Nurse: Candice Biggs  Nurse: Bran Urbina  Nurse: Angela Murphy  Nurse: Anni Dent  Ordered: ADM, User  Outpatient Provider: Jose Simmons  Override: Anni Dent  Override: Candice Biggs  PCA/Nursing Assistant: Rosibel Zhong  Primary Team: Moriah Rodriguez  Registered Dietitian: Cheryl Valladares  Research: Kalpana Bales  : Ama August

## 2023-07-09 NOTE — CONSULT NOTE ADULT - SUBJECTIVE AND OBJECTIVE BOX
[facility], Division of Infectious Diseases   CHIQUI Ramirez S. Shah, Y. Patel, G. Casimir  [phone]   weekends and after hours [phone]    TRISTEN ROSA  83y, Female  [id number]      HPI:  83y female with PMHx of RA, HTN, spinal cord injury s/p cervical fusion (2000), OA presented to the ED for the evaluation of fever at home.   Pt states shes having frequency and feels very nauseated  symptoms on going 4-5 days.  Her daughter gave her tyleonol and that helped bring fever dwon.  Then her pcp prescribed macrobid  she was getting weak and continued chills so came to ED  no sick contacts, no cough, no dyspnea, no abd pain  has had 6 bm but not soft or watery   no recent travel  no recent procedures    PMH/PSH--  Rheumatoid Arthritis (ICD9 714.0)  Closed Fracture of Epiphysis of Neck of Femur (ICD9 820.01)  Spinal cord injury, cervical region  HTN (hypertension)  HO fracture of femur  S/P ovarian cystectomy        Allergies-- baclofen      Medications--  Antibiotics: cefTRIAXone   IVPB 1000 milliGRAM(s) IV Intermittent every 24 hours    Immunologic:   Other: acetaminophen     Tablet .. PRN  diclofenac sodium 1% Gel PRN  enoxaparin Injectable  losartan  methotrexate  sodium chloride 0.9%.      Social History--  EtOH: denies ***  Tobacco: former   Drug Use: denies ***    Family/Marital History--  FHx: cancer (Father)          Travel/Environmental/Occupational History:  retired     Review of Systems:  REVIEW OF SYSTEMS  General: + fever, + chills, no wt loss	  Ophthalmologic: no blurry vision  Respiratory and Thorax: no cough, no dyspnea  Cardiovascular: no chest pain, no palpitations  Gastrointestinal:  no nausea, no vomiting, diarrhea  Genitourinary: no dysuria, no urgency, no frequency	  Musculoskeletal: no myalgias	  Neurological:  no headache	    Physical Exam--  Vital Signs: T(F): 98.6 (07-09-23 @ 16:47), Max: 99 (07-09-23 @ 03:31)  HR: 78 (07-09-23 @ 16:47)  BP: 134/68 (07-09-23 @ 16:47)  RR: 14 (07-09-23 @ 16:47)  SpO2: 95% (07-09-23 @ 16:47)  Wt(kg): --  General: uncomfortable .  HEENT: AT/NC. PERRL.  Neck: Not rigid. No sense of mass.  Nodes: None palpable.  Lungs: Clear bilaterally without rales, wheezing or rhonchi  Heart: irreg   Abdomen: Bowel sounds present and normoactive. Soft. Nondistended. Nontender.  Back: No spinal tenderness. No costovertebral angle tenderness.   Extremities: No cyanosis or clubbing. No edema.   Skin: Warm. Dry. Good turgor. No rash. No vasculitic stigmata.  Psychiatric: Appropriate affect and mood for situation.         Laboratory & Imaging Data--  CBC                        10.5   10.65 )-----------( 197      ( 09 Jul 2023 08:20 )             31.6       Chemistries  07-09    139  |  110<H>  |  28<H>  ----------------------------<  98  4.1   |  26  |  1.10    Ca    8.8      09 Jul 2023 08:20    TPro  6.1  /  Alb  2.6<L>  /  TBili  0.5  /  DBili  x   /  AST  33  /  ALT  24  /  AlkPhos  55  07-09      Culture Data    Urine Microscopic-Add On (NC) (07.08.23 @ 21:45)   White Blood Cell - Urine: 78 /HPF  Red Blood Cell - Urine: 4 /HPF  Bacteria: Many /HPF  Squamous Epithelial Cells: Present< from: CT Renal Stone Hunt (07.08.23 @ 20:23) >        INTERPRETATION:  CLINICAL INFORMATION: Fever, UTI.    COMPARISON: None.    CONTRAST/COMPLICATIONS:  IV Contrast: NONE  Oral Contrast:NONE  Complications: None reported at time of study completion    PROCEDURE:  CT of the Abdomen and Pelvis was performed.  Sagittal and coronal reformats were performed.    FINDINGS:  LOWER CHEST: Cardiomegaly.  Minimal atelectatic changes.    LIVER:Small left lobe cyst  BILE DUCTS: Normal caliber.  GALLBLADDER: Within normal limits.  SPLEEN: Within normal limits.  PANCREAS: Within normal limits.  ADRENALS: Within normal limits.  KIDNEYS/URETERS: There are 4 nonobstructing right renal calcifications   the largest measuring 11 mm. Mild right hydronephrosis. The urinary base   of bladder obscured by streak artifact therefore cannot assess for an   obstructing calculus. Punctate nonobstructing left renal calcifications.   No left hydronephrosis.    BLADDER: Mostly obscured by streak artifact. Visualized portions are   unremarkable.  REPRODUCTIVE ORGANS: Partially obscured by streak artifact. Visualized   portions are unremarkable.    BOWEL: No bowel obstruction. Appendix is normal.  PERITONEUM: No ascites.  VESSELS: Atherosclerotic changes.  RETROPERITONEUM/LYMPH NODES: No lymphadenopathy.  ABDOMINAL WALL: Within normal limits.  BONES: Degenerative changes. Bilateral hip replacements causing   significant streak artifact limiting evaluation of the pelvic structures.   Height loss of L5 and L1 of uncertain chronicity.    IMPRESSION: Limited exam secondary to streak artifact in the pelvis and   lack of intravenous contrast.    There are 4 nonobstructing right renal calcifications the largest   measuring 11 mm. Mild right hydronephrosis. The urinary base of bladder   obscured by streak artifact therefore cannot assess for an obstructing   calculus.          < end of copied text >  < from: Xray Chest 1 View AP/PA (07.08.23 @ 19:55) >    ACC: [id number] EXAM:  XR CHEST AP OR PA 1V   ORDERED BY: CRISTINE CAGE     PROCEDURE DATE:  07/08/2023          INTERPRETATION:  CLINICAL INDICATION: 83 years  Female with Sepsis.    COMPARISON: 11/23/2009    The patient's head obscures the mediastinum and lung apices.    AP view of the chest demonstrates the lungs to be clear. There is trace   right pleural effusion and no left pleural effusion. The pulmonary   vasculature is normal. There is no pneumothorax.    The heart, mediastinum and kirby cannot be assessed due to projection.    Mild thoracic degenerative changes and osteopenia are present.    IMPRESSION:    No acute infiltrate. Trace right pleural effusion.    Limited as above.    --- End of Report ---    < end of copied text >

## 2023-07-09 NOTE — PHYSICAL THERAPY INITIAL EVALUATION ADULT - PERTINENT HX OF CURRENT PROBLEM, REHAB EVAL
84yo female with PMHx of RA, HTN, spinal cord injury s/p cervical fusion (2000), OA presented to the ED for the evaluation of fever (Tmax 103F) at home. Admits to associated chills and increased urinary frequency. Patient states that for the last 3 days she has been having intermittent fevers. Patient has also been having chills, increased urinary frequency, and has progressively been feeling weak overall. Patient states that her daughter contacted her PCP and was prescribed macrobid and took one dose yesterday. Patient was brought into the ED as patient was increasingly weak and her urinary frequency was progressively getting worse. UA grossly positive with nitrite large LE, 78 WBC, many bacteria. CT renal stone hunt: There are 4 non obstructing right renal calcifications the largest measuring 11 mm. Mild right hydronephrosis.

## 2023-07-09 NOTE — CONSULT NOTE ADULT - ASSESSMENT
83y female with PMHx of RA, HTN, spinal cord injury s/p cervical fusion (2000), OA presented to the ED for the evaluation of fever urinary frequency and nausea  leukocytosis  uti    plan  ua pyuria  ct abd/pelvis -- 4 nonobstructing calculi -- right side with hydronephrosis  follow blood and urine cx    continue ceftriaxone day 2  wbc down  fever better , no documented temp in sunrise

## 2023-07-09 NOTE — CARE COORDINATION ASSESSMENT. - PATIENT HAS HAD RECENT CHANGES IN:
Subjective     Thor Gastelum is a 42 year old male who presents to clinic today for the following health issues:    HPI   Acute Illness   Acute illness concerns: Sinus infection  Onset: Over the weekend    Fever: no    Chills/Sweats: no    Headache (location?): YES    Sinus Pressure:YES- post-nasal drainage    Conjunctivitis:  no    Ear Pain: no    Rhinorrhea: no    Congestion: YES    Sore Throat: YES- Postnasal drianage     Cough: no    Wheeze: no    Decreased Appetite: no    Nausea: no    Vomiting: no    Diarrhea:  no    Dysuria/Freq.: no    Fatigue/Achiness: no    Sick/Strep Exposure: no     Therapies Tried and outcome: Sinus medication,     Patient Active Problem List   Diagnosis     CARDIOVASCULAR SCREENING; LDL GOAL LESS THAN 160     Hypertension goal BP (blood pressure) < 140/90     Tonsillar hypertrophy     Past Surgical History:   Procedure Laterality Date     HC OPEN TX METACARPAL FRACTURE SINGLE EA BONE  2000     TONSILLECTOMY Bilateral 12/20/2016    Procedure: TONSILLECTOMY;  Surgeon: Francisco Javier Chappell MD;  Location:  OR       Social History     Tobacco Use     Smoking status: Never Smoker     Smokeless tobacco: Never Used   Substance Use Topics     Alcohol use: No     Alcohol/week: 0.0 standard drinks     History reviewed. No pertinent family history.      Current Outpatient Medications   Medication Sig Dispense Refill     amoxicillin-clavulanate (AUGMENTIN) 875-125 MG tablet Take 1 tablet by mouth 2 times daily 20 tablet 0     fluticasone (FLONASE) 50 MCG/ACT nasal spray Spray 2 sprays into both nostrils daily 16 g 1     LISINOPRIL 10 MG PO tablet TAKE ONE TABLET BY MOUTH EVERY DAY 90 tablet 3     Allergies   Allergen Reactions     No Known Drug Allergies      Recent Labs   Lab Test 12/04/19  1831 05/15/16  2000  04/25/13  0824   LDL  --   --   --  75   HDL  --   --   --  43   TRIG  --   --   --  59   CR 1.01 1.17   < > 1.22   GFRESTIMATED >90 70   < > 68   GFRESTBLACK >90 85   < > 82   POTASSIUM  3.7 4.3   < > 4.3    < > = values in this interval not displayed.      BP Readings from Last 3 Encounters:   03/10/20 (!) 124/90   12/17/19 (!) 145/119   12/04/19 (!) 151/93    Wt Readings from Last 3 Encounters:   03/10/20 91.7 kg (202 lb 3.2 oz)   12/17/19 90.7 kg (200 lb)   12/04/19 94.6 kg (208 lb 8 oz)                    Reviewed and updated as needed this visit by Provider         Review of Systems   ROS COMP: Constitutional, HEENT, cardiovascular, pulmonary, GI, , musculoskeletal, neuro, skin, endocrine and psych systems are negative, except as otherwise noted.      Objective    BP (!) 122/92   Pulse 88   Temp 98.3  F (36.8  C) (Temporal)   Resp 16   Ht 1.829 m (6')   Wt 91.7 kg (202 lb 3.2 oz)   BMI 27.42 kg/m    Body mass index is 27.42 kg/m .  Physical Exam   GENERAL: healthy, alert and no distress  EYES: Eyes grossly normal to inspection, PERRL and conjunctivae and sclerae normal  HENT: normal cephalic/atraumatic, ear canals and TM's normal, nose and mouth without ulcers or lesions, nasal mucosa edematous , rhinorrhea clear, oropharynx clear, oral mucous membranes moist, sinuses: maxillary tenderness on both sides and postnasal drip is present upon exam.  NECK: no adenopathy, no asymmetry, masses, or scars and trachea midline and normal to palpation  RESP: lungs clear to auscultation - no rales, rhonchi or wheezes  CV: regular rate and rhythm, normal S1 S2, no S3 or S4, no murmur, click or rub, no peripheral edema and peripheral pulses strong  ABDOMEN: soft, nontender, no hepatosplenomegaly, no masses and bowel sounds normal  MS: no gross musculoskeletal defects noted, no edema  SKIN: no suspicious lesions or rashes to visible skin  PSYCH: mentation appears normal, affect normal/bright    Diagnostic Test Results:  Labs reviewed in Epic  No results found for this or any previous visit (from the past 24 hour(s)).        Assessment & Plan     1. Chronic maxillary sinusitis  After review of the chart  and discussing with the patient it would appear that he has had multiple sinus infections over the past years without any evaluation by ear nose and throat.  Advised that there certainly could be something underlying here that is causing this to occur and may be amenable to intervention by ear nose and throat.  We suggest that he be evaluated for the potential of nasal and sinusoidal polyps that could be causing the problem.  Advised that he take a nasal spray as directed below and follow-up with ear nose and throat in the next month.  Follow-up with primary care provider of choice for his chronic sinusitis in the near future prior to that.  - amoxicillin-clavulanate (AUGMENTIN) 875-125 MG tablet; Take 1 tablet by mouth 2 times daily  Dispense: 20 tablet; Refill: 0  - OTOLARYNGOLOGY REFERRAL  - fluticasone (FLONASE) 50 MCG/ACT nasal spray; Spray 2 sprays into both nostrils daily  Dispense: 16 g; Refill: 1     2. Hypertension goal BP (blood pressure) < 140/90  Advised that he consider increasing his lisinopril today but he defers to his upcoming appointment with his primary care provider.  Advised that he needs to take his blood pressure to heart as he is continue to mature and ongoing long-term uncontrolled hypertension can lead to further cardiovascular problems.    BMI:   Estimated body mass index is 27.42 kg/m  as calculated from the following:    Height as of this encounter: 1.829 m (6').    Weight as of this encounter: 91.7 kg (202 lb 3.2 oz).   Weight management plan: Discussed healthy diet and exercise guidelines        Work on weight loss  Regular exercise  Return in about 2 weeks (around 3/24/2020) for BP Recheck with provider, Medication Recheck, recheck of current condition.    Craig Hammond PA-C  [facility]     no changes

## 2023-07-09 NOTE — PATIENT CHOICE NOTE. - NSPTCHOICESTATE_GEN_ALL_CORE

## 2023-07-09 NOTE — ED ADULT NURSE REASSESSMENT NOTE - NS ED NURSE REASSESS COMMENT FT1
pt. is AOX3. Observed in bed resting, no signs of distress noted. IV maintenance fluids infusing at 75ml/hr. Afebrile. Call bell placed within reach. Care continued at this time.

## 2023-07-09 NOTE — CARE COORDINATION ASSESSMENT. - NSPASTMEDSURGHISTORY_GEN_ALL_CORE_FT
PAST MEDICAL & SURGICAL HISTORY:  Closed Fracture of Epiphysis of Neck of Femur (ICD9 820.01)      Rheumatoid Arthritis (ICD9 714.0)      HTN (hypertension)      Spinal cord injury, cervical region      S/P ovarian cystectomy      H/O fracture of femur

## 2023-07-09 NOTE — CARE COORDINATION ASSESSMENT. - OTHER PERTINENT DISCHARGE PLANNING INFORMATION:
met with this 83 year old female and her daughter Socorro [phone] and her son sang [phone] bedside. Seen by physical therapy and recommended for [facility]. Patient alert and orientated and all agree with recommendations. I provided transition of care packet and reviewed social work name role availability. Patient and her daughter live in apartment. she was wheelchair bound mostly but was able to ambulate very short distance to bathroom. She has no steps and therapy recommended rehab. Patient does not have HCP, blank one provided to them to review. 3 night stay explained. Per family she has UTI. Plan for [facility]

## 2023-07-09 NOTE — ED ADULT NURSE REASSESSMENT NOTE - NSFALLHARMRISKINTERV_ED_ALL_ED

## 2023-07-10 LAB
ANION GAP SERPL CALC-SCNC: 8 MMOL/L — SIGNIFICANT CHANGE UP (ref 5–17)
BASOPHILS # BLD AUTO: 0.04 K/UL — SIGNIFICANT CHANGE UP (ref 0–0.2)
BASOPHILS NFR BLD AUTO: 0.4 % — SIGNIFICANT CHANGE UP (ref 0–2)
BUN SERPL-MCNC: 23 MG/DL — SIGNIFICANT CHANGE UP (ref 7–23)
CALCIUM SERPL-MCNC: 8.5 MG/DL — SIGNIFICANT CHANGE UP (ref 8.5–10.1)
CHLORIDE SERPL-SCNC: 108 MMOL/L — SIGNIFICANT CHANGE UP (ref 96–108)
CO2 SERPL-SCNC: 25 MMOL/L — SIGNIFICANT CHANGE UP (ref 22–31)
CREAT SERPL-MCNC: 0.87 MG/DL — SIGNIFICANT CHANGE UP (ref 0.5–1.3)
CRP SERPL-MCNC: 113 MG/L — HIGH
EGFR: 66 ML/MIN/1.73M2 — SIGNIFICANT CHANGE UP
EOSINOPHIL # BLD AUTO: 0.01 K/UL — SIGNIFICANT CHANGE UP (ref 0–0.5)
EOSINOPHIL NFR BLD AUTO: 0.1 % — SIGNIFICANT CHANGE UP (ref 0–6)
GLUCOSE SERPL-MCNC: 79 MG/DL — SIGNIFICANT CHANGE UP (ref 70–99)
HCT VFR BLD CALC: 30.3 % — LOW (ref 34.5–45)
HGB BLD-MCNC: 10.1 G/DL — LOW (ref 11.5–15.5)
IMM GRANULOCYTES NFR BLD AUTO: 1.3 % — HIGH (ref 0–0.9)
LYMPHOCYTES # BLD AUTO: 1.17 K/UL — SIGNIFICANT CHANGE UP (ref 1–3.3)
LYMPHOCYTES # BLD AUTO: 11.8 % — LOW (ref 13–44)
MCHC RBC-ENTMCNC: 33.3 GM/DL — SIGNIFICANT CHANGE UP (ref 32–36)
MCHC RBC-ENTMCNC: 34.2 PG — HIGH (ref 27–34)
MCV RBC AUTO: 102.7 FL — HIGH (ref 80–100)
MONOCYTES # BLD AUTO: 0.49 K/UL — SIGNIFICANT CHANGE UP (ref 0–0.9)
MONOCYTES NFR BLD AUTO: 4.9 % — SIGNIFICANT CHANGE UP (ref 2–14)
NEUTROPHILS # BLD AUTO: 8.06 K/UL — HIGH (ref 1.8–7.4)
NEUTROPHILS NFR BLD AUTO: 81.5 % — HIGH (ref 43–77)
NRBC # BLD: 0 /100 WBCS — SIGNIFICANT CHANGE UP (ref 0–0)
PLATELET # BLD AUTO: 204 K/UL — SIGNIFICANT CHANGE UP (ref 150–400)
POTASSIUM SERPL-MCNC: 3.4 MMOL/L — LOW (ref 3.5–5.3)
POTASSIUM SERPL-SCNC: 3.4 MMOL/L — LOW (ref 3.5–5.3)
RBC # BLD: 2.95 M/UL — LOW (ref 3.8–5.2)
RBC # FLD: 14.7 % — HIGH (ref 10.3–14.5)
SODIUM SERPL-SCNC: 141 MMOL/L — SIGNIFICANT CHANGE UP (ref 135–145)
WBC # BLD: 9.9 K/UL — SIGNIFICANT CHANGE UP (ref 3.8–10.5)
WBC # FLD AUTO: 9.9 K/UL — SIGNIFICANT CHANGE UP (ref 3.8–10.5)

## 2023-07-10 RX ORDER — POTASSIUM CHLORIDE 20 MEQ
40 PACKET (EA) ORAL ONCE
Refills: 0 | Status: COMPLETED | OUTPATIENT
Start: 2023-07-10 | End: 2023-07-11

## 2023-07-10 RX ADMIN — LOSARTAN POTASSIUM 100 MILLIGRAM(S): 100 TABLET, FILM COATED ORAL at 05:40

## 2023-07-10 RX ADMIN — CEFTRIAXONE 100 MILLIGRAM(S): 500 INJECTION, POWDER, FOR SOLUTION INTRAMUSCULAR; INTRAVENOUS at 03:25

## 2023-07-10 RX ADMIN — ENOXAPARIN SODIUM 40 MILLIGRAM(S): 100 INJECTION SUBCUTANEOUS at 03:24

## 2023-07-11 RX ORDER — POTASSIUM CHLORIDE 20 MEQ
20 PACKET (EA) ORAL ONCE
Refills: 0 | Status: DISCONTINUED | OUTPATIENT
Start: 2023-07-11 | End: 2023-07-11

## 2023-07-11 RX ORDER — HALOPERIDOL DECANOATE 100 MG/ML
1 INJECTION INTRAMUSCULAR EVERY 6 HOURS
Refills: 0 | Status: DISCONTINUED | OUTPATIENT
Start: 2023-07-11 | End: 2023-07-14

## 2023-07-11 RX ADMIN — CEFTRIAXONE 100 MILLIGRAM(S): 500 INJECTION, POWDER, FOR SOLUTION INTRAMUSCULAR; INTRAVENOUS at 03:47

## 2023-07-11 RX ADMIN — ENOXAPARIN SODIUM 40 MILLIGRAM(S): 100 INJECTION SUBCUTANEOUS at 03:47

## 2023-07-11 RX ADMIN — Medication 40 MILLIEQUIVALENT(S): at 07:19

## 2023-07-11 RX ADMIN — LOSARTAN POTASSIUM 100 MILLIGRAM(S): 100 TABLET, FILM COATED ORAL at 05:56

## 2023-07-11 NOTE — SOCIAL WORK PROGRESS NOTE - NSSWPROGRESSNOTE_GEN_ALL_CORE
Per Tx team, pt is having a change of mental status. She remains acute on IV antibiotics. SW following.

## 2023-07-12 RX ORDER — POTASSIUM CHLORIDE 20 MEQ
20 PACKET (EA) ORAL ONCE
Refills: 0 | Status: COMPLETED | OUTPATIENT
Start: 2023-07-12 | End: 2023-07-12

## 2023-07-12 RX ADMIN — Medication 20 MILLIEQUIVALENT(S): at 14:32

## 2023-07-12 RX ADMIN — CEFTRIAXONE 100 MILLIGRAM(S): 500 INJECTION, POWDER, FOR SOLUTION INTRAMUSCULAR; INTRAVENOUS at 03:12

## 2023-07-12 RX ADMIN — LOSARTAN POTASSIUM 100 MILLIGRAM(S): 100 TABLET, FILM COATED ORAL at 05:02

## 2023-07-12 RX ADMIN — ENOXAPARIN SODIUM 40 MILLIGRAM(S): 100 INJECTION SUBCUTANEOUS at 03:12

## 2023-07-12 NOTE — PATIENT PROFILE ADULT - FALL HARM RISK - RISK INTERVENTIONS
[Negative] : Heme/Lymph Assistance OOB with selected safe patient handling equipment/Assistance with ambulation/Communicate Fall Risk and Risk Factors to all staff, patient, and family/Discuss with provider need for PT consult/Monitor gait and stability/Provide patient with walking aids - walker, cane, crutches/Reinforce activity limits and safety measures with patient and family/Visual Cue: Yellow wristband/Bed in lowest position, wheels locked, appropriate side rails in place/Call bell, personal items and telephone in reach/Instruct patient to call for assistance before getting out of bed or chair/Non-slip footwear when patient is out of bed/[place] to call system/Physically safe environment - no spills, clutter or unnecessary equipment/Purposeful Proactive Rounding/Room/bathroom lighting operational, light cord in reach

## 2023-07-13 LAB
CULTURE RESULTS: SIGNIFICANT CHANGE UP
CULTURE RESULTS: SIGNIFICANT CHANGE UP
SPECIMEN SOURCE: SIGNIFICANT CHANGE UP
SPECIMEN SOURCE: SIGNIFICANT CHANGE UP

## 2023-07-13 RX ADMIN — METHOTREXATE 2.5 MILLIGRAM(S): 2.5 TABLET ORAL at 06:02

## 2023-07-13 RX ADMIN — Medication 1 TABLET(S): at 18:25

## 2023-07-13 RX ADMIN — LOSARTAN POTASSIUM 100 MILLIGRAM(S): 100 TABLET, FILM COATED ORAL at 06:02

## 2023-07-13 RX ADMIN — Medication 1 TABLET(S): at 06:02

## 2023-07-13 RX ADMIN — ENOXAPARIN SODIUM 40 MILLIGRAM(S): 100 INJECTION SUBCUTANEOUS at 02:07

## 2023-07-14 ENCOUNTER — TRANSCRIPTION ENCOUNTER (OUTPATIENT)
Age: 83
End: 2023-07-14

## 2023-07-14 VITALS
HEART RATE: 61 BPM | TEMPERATURE: 98 F | DIASTOLIC BLOOD PRESSURE: 65 MMHG | RESPIRATION RATE: 20 BRPM | SYSTOLIC BLOOD PRESSURE: 112 MMHG | OXYGEN SATURATION: 96 %

## 2023-07-14 PROCEDURE — 71045 X-RAY EXAM CHEST 1 VIEW: CPT

## 2023-07-14 PROCEDURE — 83605 ASSAY OF LACTIC ACID: CPT

## 2023-07-14 PROCEDURE — 97162 PT EVAL MOD COMPLEX 30 MIN: CPT

## 2023-07-14 PROCEDURE — 96375 TX/PRO/DX INJ NEW DRUG ADDON: CPT

## 2023-07-14 PROCEDURE — 96374 THER/PROPH/DIAG INJ IV PUSH: CPT

## 2023-07-14 PROCEDURE — 85025 COMPLETE CBC W/AUTO DIFF WBC: CPT

## 2023-07-14 PROCEDURE — 87186 SC STD MICRODIL/AGAR DIL: CPT

## 2023-07-14 PROCEDURE — 36415 COLL VENOUS BLD VENIPUNCTURE: CPT

## 2023-07-14 PROCEDURE — 87077 CULTURE AEROBIC IDENTIFY: CPT

## 2023-07-14 PROCEDURE — 74176 CT ABD & PELVIS W/O CONTRAST: CPT | Mod: MA

## 2023-07-14 PROCEDURE — 80048 BASIC METABOLIC PNL TOTAL CA: CPT

## 2023-07-14 PROCEDURE — 80053 COMPREHEN METABOLIC PANEL: CPT

## 2023-07-14 PROCEDURE — 87040 BLOOD CULTURE FOR BACTERIA: CPT

## 2023-07-14 PROCEDURE — 99285 EMERGENCY DEPT VISIT HI MDM: CPT

## 2023-07-14 PROCEDURE — 97110 THERAPEUTIC EXERCISES: CPT

## 2023-07-14 PROCEDURE — 86140 C-REACTIVE PROTEIN: CPT

## 2023-07-14 PROCEDURE — 93005 ELECTROCARDIOGRAM TRACING: CPT

## 2023-07-14 PROCEDURE — 81001 URINALYSIS AUTO W/SCOPE: CPT

## 2023-07-14 PROCEDURE — 87086 URINE CULTURE/COLONY COUNT: CPT

## 2023-07-14 PROCEDURE — 97530 THERAPEUTIC ACTIVITIES: CPT

## 2023-07-14 PROCEDURE — 83036 HEMOGLOBIN GLYCOSYLATED A1C: CPT

## 2023-07-14 PROCEDURE — 97116 GAIT TRAINING THERAPY: CPT

## 2023-07-14 RX ADMIN — ENOXAPARIN SODIUM 40 MILLIGRAM(S): 100 INJECTION SUBCUTANEOUS at 03:58

## 2023-07-14 RX ADMIN — Medication 1 TABLET(S): at 05:43

## 2023-07-14 RX ADMIN — LOSARTAN POTASSIUM 100 MILLIGRAM(S): 100 TABLET, FILM COATED ORAL at 05:43

## 2023-07-14 NOTE — DISCHARGE NOTE PROVIDER - CARE PROVIDER_API CALL
Chadwick Peterson Shayan  Internal Medicine  [address], [address] [place] [address]  Phone: [phone]  Fax: [phone]  Follow Up Time: 2 weeks

## 2023-07-14 NOTE — DISCHARGE NOTE PROVIDER - NSDCMRMEDTOKEN_GEN_ALL_CORE_FT
losartan 100 mg oral tablet: 1 orally once a day  methotrexate 2.5 mg oral tablet: orally once a day ONLY ON THURSDAYS

## 2023-07-14 NOTE — PROGRESS NOTE ADULT - SUBJECTIVE AND OBJECTIVE BOX
[facility]  Winston Trevizo MD PhD, Tiffany Springer MD, Pat Weeks MD, Ac Tapia MD, Bruno Chong MD  and providing coverage with Ced Springer MD  Providing Infectious Disease Consultations at [facility], [facility], [facility]'s    Office# [phone] to schedule follow up appointments  Answering Service for urgent calls or New Consults [phone]  Cell# to text for urgent issues Winston Trevizo [phone]     infectious diseases progress note:    TRISTEN ROSA is a 83y y. o. Female patient    Overnight and events of the last 24hrs reviewed    Allergies    Demerol HCl (Unknown)  phenylpiperidine derivatives (Unknown)  baclofen (Unknown)  loperamide (Unknown)  tramadol (Unknown)    Intolerances        ANTIBIOTICS/RELEVANT:  antimicrobials  cefTRIAXone   IVPB 1000 milliGRAM(s) IV Intermittent every 24 hours    immunologic:    OTHER:  acetaminophen     Tablet .. 650 milliGRAM(s) Oral every 6 hours PRN  diclofenac sodium 1% Gel 4 Gram(s) Topical two times a day PRN  enoxaparin Injectable 40 milliGRAM(s) SubCutaneous every 24 hours  haloperidol    Injectable 1 milliGRAM(s) IntraMuscular every 6 hours PRN  losartan 100 milliGRAM(s) Oral daily  methotrexate 2.5 milliGRAM(s) Oral <User Schedule>  sodium chloride 0.9%. 1000 milliLiter(s) IV Continuous <Continuous>      Objective:  Vital Signs Last 24 Hrs  T(C): 36.4 (12 Jul 2023 14:33), Max: 36.5 (11 Jul 2023 20:20)  T(F): 97.5 (12 Jul 2023 14:33), Max: 97.7 (11 Jul 2023 20:20)  HR: 83 (12 Jul 2023 14:33) (69 - 83)  BP: 161/76 (12 Jul 2023 14:33) (122/72 - 161/76)  BP(mean): --  RR: 19 (12 Jul 2023 14:33) (18 - 19)  SpO2: 97% (12 Jul 2023 14:33) (97% - 100%)    Parameters below as of 12 Jul 2023 14:33  Patient On (Oxygen Delivery Method): room air        T(C): 36.4 (07-12-23 @ 14:33), Max: 37.2 (07-11-23 @ 05:15)  T(C): 36.4 (07-12-23 @ 14:33), Max: 37.2 (07-11-23 @ 05:15)  T(C): 36.4 (07-12-23 @ 14:33), Max: 37.3 (07-08-23 @ 18:18)    PHYSICAL EXAM:  HEENT: NC atraumatic  Neck: supple  Respiratory: no accessory muscle use, breathing comfortably  Cardiovascular: distant  Gastrointestinal: normal appearing, nondistended  Extremities: no clubbing, no cyanosis,        LABS:        WBC  9.90 07-10 @ 06:28  10.65 07-09 @ 08:20  14.53 07-08 @ 19:15              Creatinine: 0.87 mg/dL (07-10-23 @ 06:28)  Creatinine: 1.10 mg/dL (07-09-23 @ 08:20)  Creatinine: 1.30 mg/dL (07-08-23 @ 19:15)                INFLAMMATORY MARKERS      MICROBIOLOGY:    Culture - Urine (07.08.23 @ 21:45)    -  Amikacin: S <=16   -  Amoxicillin/Clavulanic Acid: S <=8/4   -  Ampicillin: R 16 These ampicillin results predict results for amoxicillin   -  Ampicillin/Sulbactam: S <=4/2 Enterobacter, Klebsiella aerogenes, Citrobacter, and Serratia may develop resistance during prolonged therapy (3-4 days)   -  Aztreonam: S <=4   -  Cefazolin: S <=2 For uncomplicated UTI with K. pneumoniae, E. coli, or P. mirablis: JOSUE <=16 is sensitive and JOSUE >=32 is resistant. This also predicts results for oral agents cefaclor, cefdinir, cefpodoxime, cefprozil, cefuroxime axetil, cephalexin and locarbef for uncomplicated UTI. Note that some isolates may be susceptible to these agents while testing resistant to cefazolin.   -  Cefepime: S <=2   -  Trimethoprim/Sulfamethoxazole: S <=0.5/9.5   -  Levofloxacin: S <=0.5   -  Meropenem: S <=1   -  Nitrofurantoin: S <=32 Should not be used to treat pyelonephritis   -  Piperacillin/Tazobactam: S <=8   -  Tobramycin: S <=2   -  Cefoxitin: S <=8   -  Ceftriaxone: S <=1 Enterobacter, Klebsiella aerogenes, Citrobacter, and Serratia may develop resistance during prolonged therapy   -  Cefuroxime: S <=4   -  Ciprofloxacin: S <=0.25   -  Ertapenem: S <=0.5   -  Gentamicin: S <=2   -  Imipenem: S <=1   Specimen Source: Clean Catch Clean Catch (Midstream)   Culture Results:   50,000 - 99,000 CFU/mL Klebsiella pneumoniae   Organism Identification: Klebsiella pneumoniae   Organism: Klebsiella pneumoniae   Method Type: JOSUE            RADIOLOGY & ADDITIONAL STUDIES:  
[facility]  Winston Trevizo MD PhD, Tiffany Springer MD, Pat Weeks MD, Ac Tapia MD, Bruno Chong MD  and providing coverage with Ced Springer MD  Providing Infectious Disease Consultations at [facility], [facility], [facility]'s    Office# [phone] to schedule follow up appointments  Answering Service for urgent calls or New Consults [phone]  Cell# to text for urgent issues Winston Trevizo [phone]     infectious diseases progress note:    TRISTEN ROSA is a 83y y. o. Female patient    Overnight and events of the last 24hrs reviewed    Allergies    Demerol HCl (Unknown)  phenylpiperidine derivatives (Unknown)  baclofen (Unknown)  loperamide (Unknown)  tramadol (Unknown)    Intolerances        ANTIBIOTICS/RELEVANT:  antimicrobials  cefTRIAXone   IVPB 1000 milliGRAM(s) IV Intermittent every 24 hours    immunologic:    OTHER:  acetaminophen     Tablet .. 650 milliGRAM(s) Oral every 6 hours PRN  diclofenac sodium 1% Gel 4 Gram(s) Topical two times a day PRN  enoxaparin Injectable 40 milliGRAM(s) SubCutaneous every 24 hours  losartan 100 milliGRAM(s) Oral daily  methotrexate 2.5 milliGRAM(s) Oral <User Schedule>  sodium chloride 0.9%. 1000 milliLiter(s) IV Continuous <Continuous>      Objective:  Vital Signs Last 24 Hrs  T(C): 36.7 (10 Jul 2023 12:02), Max: 37 (09 Jul 2023 16:47)  T(F): 98 (10 Jul 2023 12:02), Max: 98.6 (09 Jul 2023 16:47)  HR: 72 (10 Jul 2023 12:02) (72 - 83)  BP: 156/70 (10 Jul 2023 12:02) (134/68 - 156/70)  BP(mean): --  RR: 24 (10 Jul 2023 12:02) (14 - 24)  SpO2: 97% (10 Jul 2023 12:02) (95% - 97%)    Parameters below as of 10 Jul 2023 12:02  Patient On (Oxygen Delivery Method): room air        T(C): 36.7 (07-10-23 @ 12:02), Max: 37.3 (07-08-23 @ 18:18)  T(C): 36.7 (07-10-23 @ 12:02), Max: 37.3 (07-08-23 @ 18:18)  T(C): 36.7 (07-10-23 @ 12:02), Max: 37.3 (07-08-23 @ 18:18)    PHYSICAL EXAM:  HEENT: NC atraumatic  Neck: supple  Respiratory: no accessory muscle use, breathing comfortably  Cardiovascular: distant  Gastrointestinal: normal appearing, nondistended  Extremities: no clubbing, no cyanosis,        LABS:                          10.1   9.90  )-----------( 204      ( 10 Jul 2023 06:28 )             30.3       WBC  9.90 07-10 @ 06:28  10.65 07-09 @ 08:20  14.53 07-08 @ 19:15      07-10    141  |  108  |  23  ----------------------------<  79  3.4<L>   |  25  |  0.87    Ca    8.5      10 Jul 2023 06:28    TPro  6.1  /  Alb  2.6<L>  /  TBili  0.5  /  DBili  x   /  AST  33  /  ALT  24  /  AlkPhos  55  07-09      Creatinine: 0.87 mg/dL (07-10-23 @ 06:28)  Creatinine: 1.10 mg/dL (07-09-23 @ 08:20)  Creatinine: 1.30 mg/dL (07-08-23 @ 19:15)        Urinalysis Basic - ( 10 Jul 2023 06:28 )    Color: x / Appearance: x / SG: x / pH: x  Gluc: 79 mg/dL / Ketone: x  / Bili: x / Urobili: x   Blood: x / Protein: x / Nitrite: x   Leuk Esterase: x / RBC: x / WBC x   Sq Epi: x / Non Sq Epi: x / Bacteria: x            INFLAMMATORY MARKERS      MICROBIOLOGY:              RADIOLOGY & ADDITIONAL STUDIES:  
Patient is a 83y old  Female who presents with a chief complaint of fever/UTI (09 Jul 2023 18:18)        INTERVAL HPI/OVERNIGHT EVENTS:   no complaints  pt seen and examined         Vital Signs Last 24 Hrs  T(C): 36.7 (10 Jul 2023 12:02), Max: 37 (09 Jul 2023 16:47)  T(F): 98 (10 Jul 2023 12:02), Max: 98.6 (09 Jul 2023 16:47)  HR: 72 (10 Jul 2023 12:02) (72 - 83)  BP: 156/70 (10 Jul 2023 12:02) (134/68 - 156/70)  BP(mean): --  RR: 24 (10 Jul 2023 12:02) (14 - 24)  SpO2: 97% (10 Jul 2023 12:02) (95% - 97%)    Parameters below as of 10 Jul 2023 12:02  Patient On (Oxygen Delivery Method): room air        acetaminophen     Tablet .. 650 milliGRAM(s) Oral every 6 hours PRN  cefTRIAXone   IVPB 1000 milliGRAM(s) IV Intermittent every 24 hours  diclofenac sodium 1% Gel 4 Gram(s) Topical two times a day PRN  enoxaparin Injectable 40 milliGRAM(s) SubCutaneous every 24 hours  losartan 100 milliGRAM(s) Oral daily  methotrexate 2.5 milliGRAM(s) Oral <User Schedule>  sodium chloride 0.9%. 1000 milliLiter(s) IV Continuous <Continuous>      PHYSICAL EXAM:  GENERAL: NAD   EYES: conjunctiva and sclera clear  ENMT: Moist mucous membranes  NECK: Supple, No JVD, Normal thyroid  CHEST/LUNG: non labored, cta b/l  HEART: Regular rate and rhythm; No murmurs, rubs, or gallops  ABDOMEN: Soft, Nontender, Nondistended; Bowel sounds present  EXTREMITIES:  2+ Peripheral Pulses, No clubbing, cyanosis, or edema  LYMPH: No lymphadenopathy noted  SKIN: No rashes or lesions    Consultant(s) Notes Reviewed:  [x ] YES  [ ] NO  Care Discussed with Consultants/Other Providers [ x] YES  [ ] NO    LABS:                        10.1   9.90  )-----------( 204      ( 10 Jul 2023 06:28 )             30.3     07-10    141  |  108  |  23  ----------------------------<  79  3.4<L>   |  25  |  0.87    Ca    8.5      10 Jul 2023 06:28    TPro  6.1  /  Alb  2.6<L>  /  TBili  0.5  /  DBili  x   /  AST  33  /  ALT  24  /  AlkPhos  55  07-09      Urinalysis Basic - ( 10 Jul 2023 06:28 )    Color: x / Appearance: x / SG: x / pH: x  Gluc: 79 mg/dL / Ketone: x  / Bili: x / Urobili: x   Blood: x / Protein: x / Nitrite: x   Leuk Esterase: x / RBC: x / WBC x   Sq Epi: x / Non Sq Epi: x / Bacteria: x      CAPILLARY BLOOD GLUCOSE            Urinalysis Basic - ( 10 Jul 2023 06:28 )    Color: x / Appearance: x / SG: x / pH: x  Gluc: 79 mg/dL / Ketone: x  / Bili: x / Urobili: x   Blood: x / Protein: x / Nitrite: x   Leuk Esterase: x / RBC: x / WBC x   Sq Epi: x / Non Sq Epi: x / Bacteria: x        Culture - Blood (collected 08 Jul 2023 19:10)  Source: .Blood Blood-Peripheral  Preliminary Report (09 Jul 2023 23:02):    No growth at 24 hours    Culture - Blood (collected 08 Jul 2023 19:00)  Source: .Blood Blood-Peripheral  Preliminary Report (09 Jul 2023 23:02):    No growth at 24 hours        RADIOLOGY & ADDITIONAL TESTS:    Imaging Personally Reviewed  Reviewed consultants input
Patient is a 83y old  Female who presents with a chief complaint of fever/UTI (12 Jul 2023 15:13)      INTERVAL HPI/OVERNIGHT EVENTS: noted  pt seen and examined this am   events noted  confused at times, hallucinations  calm and cooperative      Vital Signs Last 24 Hrs  T(C): 36.4 (12 Jul 2023 14:33), Max: 36.5 (11 Jul 2023 20:20)  T(F): 97.5 (12 Jul 2023 14:33), Max: 97.7 (11 Jul 2023 20:20)  HR: 83 (12 Jul 2023 14:33) (69 - 83)  BP: 161/76 (12 Jul 2023 14:33) (122/72 - 161/76)  BP(mean): --  RR: 19 (12 Jul 2023 14:33) (18 - 19)  SpO2: 97% (12 Jul 2023 14:33) (97% - 100%)    Parameters below as of 12 Jul 2023 14:33  Patient On (Oxygen Delivery Method): room air        acetaminophen     Tablet .. 650 milliGRAM(s) Oral every 6 hours PRN  diclofenac sodium 1% Gel 4 Gram(s) Topical two times a day PRN  enoxaparin Injectable 40 milliGRAM(s) SubCutaneous every 24 hours  haloperidol    Injectable 1 milliGRAM(s) IntraMuscular every 6 hours PRN  losartan 100 milliGRAM(s) Oral daily  methotrexate 2.5 milliGRAM(s) Oral <User Schedule>  sodium chloride 0.9%. 1000 milliLiter(s) IV Continuous <Continuous>      PHYSICAL EXAM:  GENERAL: NAD,   EYES: conjunctiva and sclera clear  ENMT: Moist mucous membranes  NECK: Supple, No JVD, Normal thyroid  CHEST/LUNG: non labored, cta b/l  HEART: Regular rate and rhythm; No murmurs, rubs, or gallops  ABDOMEN: Soft, Nontender, Nondistended; Bowel sounds present  EXTREMITIES:  2+ Peripheral Pulses, No clubbing, cyanosis, or edema  LYMPH: No lymphadenopathy noted  SKIN: No rashes or lesions    Consultant(s) Notes Reviewed:  [x ] YES  [ ] NO  Care Discussed with Consultants/Other Providers [ x] YES  [ ] NO    LABS:              CAPILLARY BLOOD GLUCOSE                  RADIOLOGY & ADDITIONAL TESTS:    Imaging Personally Reviewed:  [x ] YES  [ ] NO
[facility]  Winston Trevizo MD PhD, Tiffany Springer MD, Pat Weeks MD, Ac Tapia MD, Bruno Chong MD  and providing coverage with Ced Springer MD  Providing Infectious Disease Consultations at [facility], [facility], [facility]'s    Office# [phone] to schedule follow up appointments  Answering Service for urgent calls or New Consults [phone]  Cell# to text for urgent issues Winston Trevizo [phone]     infectious diseases progress note:    TRISTEN ROSA is a 83y y. o. Female patient    Overnight and events of the last 24hrs reviewed    Allergies    Demerol HCl (Unknown)  phenylpiperidine derivatives (Unknown)  baclofen (Unknown)  loperamide (Unknown)  tramadol (Unknown)    Intolerances        ANTIBIOTICS/RELEVANT:  antimicrobials  cefTRIAXone   IVPB 1000 milliGRAM(s) IV Intermittent every 24 hours    immunologic:    OTHER:  acetaminophen     Tablet .. 650 milliGRAM(s) Oral every 6 hours PRN  diclofenac sodium 1% Gel 4 Gram(s) Topical two times a day PRN  enoxaparin Injectable 40 milliGRAM(s) SubCutaneous every 24 hours  losartan 100 milliGRAM(s) Oral daily  methotrexate 2.5 milliGRAM(s) Oral <User Schedule>  sodium chloride 0.9%. 1000 milliLiter(s) IV Continuous <Continuous>      Objective:  Vital Signs Last 24 Hrs  T(C): 37.2 (11 Jul 2023 05:15), Max: 37.2 (11 Jul 2023 05:15)  T(F): 98.9 (11 Jul 2023 05:15), Max: 98.9 (11 Jul 2023 05:15)  HR: 70 (11 Jul 2023 05:15) (70 - 72)  BP: 163/75 (11 Jul 2023 05:15) (156/70 - 179/71)  BP(mean): --  RR: 19 (11 Jul 2023 05:15) (19 - 24)  SpO2: 97% (11 Jul 2023 05:15) (97% - 99%)    Parameters below as of 11 Jul 2023 05:15  Patient On (Oxygen Delivery Method): room air        T(C): 37.2 (07-11-23 @ 05:15), Max: 37.2 (07-11-23 @ 05:15)  T(C): 37.2 (07-11-23 @ 05:15), Max: 37.3 (07-08-23 @ 18:18)  T(C): 37.2 (07-11-23 @ 05:15), Max: 37.3 (07-08-23 @ 18:18)    PHYSICAL EXAM:  HEENT: NC atraumatic  Neck: supple  Respiratory: no accessory muscle use, breathing comfortably  Cardiovascular: distant  Gastrointestinal: normal appearing, nondistended  Extremities: no clubbing, no cyanosis,        LABS:                          10.1   9.90  )-----------( 204      ( 10 Jul 2023 06:28 )             30.3       WBC  9.90 07-10 @ 06:28  10.65 07-09 @ 08:20  14.53 07-08 @ 19:15      07-10    141  |  108  |  23  ----------------------------<  79  3.4<L>   |  25  |  0.87    Ca    8.5      10 Jul 2023 06:28        Creatinine: 0.87 mg/dL (07-10-23 @ 06:28)  Creatinine: 1.10 mg/dL (07-09-23 @ 08:20)  Creatinine: 1.30 mg/dL (07-08-23 @ 19:15)        Urinalysis Basic - ( 10 Jul 2023 06:28 )    Color: x / Appearance: x / SG: x / pH: x  Gluc: 79 mg/dL / Ketone: x  / Bili: x / Urobili: x   Blood: x / Protein: x / Nitrite: x   Leuk Esterase: x / RBC: x / WBC x   Sq Epi: x / Non Sq Epi: x / Bacteria: x            INFLAMMATORY MARKERS      MICROBIOLOGY:    Culture - Urine (07.08.23 @ 21:45)    Specimen Source: Clean Catch Clean Catch (Midstream)   Culture Results:   50,000 - 99,000 CFU/mL Gram Negative Rods        RADIOLOGY & ADDITIONAL STUDIES:  
[facility]OUS DISEASE  Winston Trevizo MD PhD, Tiffany Springer MD, Pat Weeks MD, Ac Tapia MD, Bruno Chong MD  and providing coverage with Ced Springer MD  Providing Infectious Disease Consultations at [facility], [facility], [facility]'s    Office# [phone] to schedule follow up appointments  Answering Service for urgent calls or New Consults [phone]  Cell# to text for urgent issues Winston Trevizo [phone]     infectious diseases progress note:    TRISTEN ROSA is a 83y y. o. Female patient    Overnight and events of the last 24hrs reviewed    Allergies    Demerol HCl (Unknown)  phenylpiperidine derivatives (Unknown)  baclofen (Unknown)  loperamide (Unknown)  tramadol (Unknown)    Intolerances        ANTIBIOTICS/RELEVANT:  antimicrobials  trimethoprim  160 mG/sulfamethoxazole 800 mG 1 Tablet(s) Oral two times a day    immunologic:    OTHER:  acetaminophen     Tablet .. 650 milliGRAM(s) Oral every 6 hours PRN  diclofenac sodium 1% Gel 4 Gram(s) Topical two times a day PRN  enoxaparin Injectable 40 milliGRAM(s) SubCutaneous every 24 hours  haloperidol    Injectable 1 milliGRAM(s) IntraMuscular every 6 hours PRN  losartan 100 milliGRAM(s) Oral daily  methotrexate 2.5 milliGRAM(s) Oral <User Schedule>  sodium chloride 0.9%. 1000 milliLiter(s) IV Continuous <Continuous>      Objective:  Vital Signs Last 24 Hrs  T(C): 36.5 (14 Jul 2023 04:38), Max: 36.5 (14 Jul 2023 04:38)  T(F): 97.7 (14 Jul 2023 04:38), Max: 97.7 (14 Jul 2023 04:38)  HR: 61 (14 Jul 2023 04:38) (61 - 65)  BP: 115/64 (14 Jul 2023 04:38) (98/55 - 115/64)  BP(mean): --  RR: 18 (14 Jul 2023 04:38) (18 - 19)  SpO2: 97% (14 Jul 2023 04:38) (97% - 99%)    Parameters below as of 13 Jul 2023 20:55  Patient On (Oxygen Delivery Method): room air        T(C): 36.5 (07-14-23 @ 04:38), Max: 36.6 (07-13-23 @ 11:50)  T(C): 36.5 (07-14-23 @ 04:38), Max: 36.7 (07-11-23 @ 14:49)  T(C): 36.5 (07-14-23 @ 04:38), Max: 37.2 (07-11-23 @ 05:15)    PHYSICAL EXAM:  HEENT: NC atraumatic  Neck: supple  Respiratory: no accessory muscle use, breathing comfortably  Cardiovascular: distant  Gastrointestinal: normal appearing, nondistended  Extremities: no clubbing, no cyanosis,        LABS:        WBC  9.90 07-10 @ 06:28  10.65 07-09 @ 08:20  14.53 07-08 @ 19:15              Creatinine: 0.87 mg/dL (07-10-23 @ 06:28)  Creatinine: 1.10 mg/dL (07-09-23 @ 08:20)  Creatinine: 1.30 mg/dL (07-08-23 @ 19:15)                INFLAMMATORY MARKERS      MICROBIOLOGY:              RADIOLOGY & ADDITIONAL STUDIES:  
Patient is a 83y old  Female who presents with a chief complaint of fever/UTI (08 Jul 2023 22:11)        INTERVAL HPI/OVERNIGHT EVENTS:   no complaints  pt seen and examined         Vital Signs Last 24 Hrs  T(C): 37 (09 Jul 2023 16:47), Max: 37.3 (08 Jul 2023 18:18)  T(F): 98.6 (09 Jul 2023 16:47), Max: 99.1 (08 Jul 2023 18:18)  HR: 78 (09 Jul 2023 16:47) (64 - 84)  BP: 134/68 (09 Jul 2023 16:47) (113/66 - 157/80)  BP(mean): --  RR: 14 (09 Jul 2023 16:47) (14 - 18)  SpO2: 95% (09 Jul 2023 16:47) (95% - 98%)    Parameters below as of 09 Jul 2023 16:47  Patient On (Oxygen Delivery Method): room air        acetaminophen     Tablet .. 650 milliGRAM(s) Oral every 6 hours PRN  cefTRIAXone   IVPB 1000 milliGRAM(s) IV Intermittent every 24 hours  diclofenac sodium 1% Gel 4 Gram(s) Topical two times a day PRN  enoxaparin Injectable 40 milliGRAM(s) SubCutaneous every 24 hours  losartan 100 milliGRAM(s) Oral daily  methotrexate 2.5 milliGRAM(s) Oral <User Schedule>  sodium chloride 0.9%. 1000 milliLiter(s) IV Continuous <Continuous>      PHYSICAL EXAM:  GENERAL: NAD   EYES: conjunctiva and sclera clear  ENMT: Moist mucous membranes  NECK: Supple, No JVD, Normal thyroid  CHEST/LUNG: non labored, cta b/l  HEART: Regular rate and rhythm; No murmurs, rubs, or gallops  ABDOMEN: Soft, Nontender, Nondistended; Bowel sounds present  EXTREMITIES:  2+ Peripheral Pulses, No clubbing, cyanosis, or edema  LYMPH: No lymphadenopathy noted  SKIN: sacral DTI    Consultant(s) Notes Reviewed:  [x ] YES  [ ] NO  Care Discussed with Consultants/Other Providers [ x] YES  [ ] NO    LABS:                        10.5   10.65 )-----------( 197      ( 09 Jul 2023 08:20 )             31.6     07-09    139  |  110<H>  |  28<H>  ----------------------------<  98  4.1   |  26  |  1.10    Ca    8.8      09 Jul 2023 08:20    TPro  6.1  /  Alb  2.6<L>  /  TBili  0.5  /  DBili  x   /  AST  33  /  ALT  24  /  AlkPhos  55  07-09      Urinalysis Basic - ( 09 Jul 2023 08:20 )    Color: x / Appearance: x / SG: x / pH: x  Gluc: 98 mg/dL / Ketone: x  / Bili: x / Urobili: x   Blood: x / Protein: x / Nitrite: x   Leuk Esterase: x / RBC: x / WBC x   Sq Epi: x / Non Sq Epi: x / Bacteria: x      CAPILLARY BLOOD GLUCOSE            Urinalysis Basic - ( 09 Jul 2023 08:20 )    Color: x / Appearance: x / SG: x / pH: x  Gluc: 98 mg/dL / Ketone: x  / Bili: x / Urobili: x   Blood: x / Protein: x / Nitrite: x   Leuk Esterase: x / RBC: x / WBC x   Sq Epi: x / Non Sq Epi: x / Bacteria: x          RADIOLOGY & ADDITIONAL TESTS:    Imaging Personally Reviewed  Reviewed consultants input
[facility]  Winston Trevizo MD PhD, Tiffany Springer MD, Pat Weeks MD, Ac Tapia MD, Bruno Chong MD  and providing coverage with Ced Springer MD  Providing Infectious Disease Consultations at [facility], [facility], [facility]'s    Office# [phone] to schedule follow up appointments  Answering Service for urgent calls or New Consults [phone]  Cell# to text for urgent issues Winston Trevizo [phone]     infectious diseases progress note:    TRISTEN ROSA is a 83y y. o. Female patient    Overnight and events of the last 24hrs reviewed    Allergies    Demerol HCl (Unknown)  phenylpiperidine derivatives (Unknown)  baclofen (Unknown)  loperamide (Unknown)  tramadol (Unknown)    Intolerances        ANTIBIOTICS/RELEVANT:  antimicrobials  trimethoprim  160 mG/sulfamethoxazole 800 mG 1 Tablet(s) Oral two times a day    immunologic:    OTHER:  acetaminophen     Tablet .. 650 milliGRAM(s) Oral every 6 hours PRN  diclofenac sodium 1% Gel 4 Gram(s) Topical two times a day PRN  enoxaparin Injectable 40 milliGRAM(s) SubCutaneous every 24 hours  haloperidol    Injectable 1 milliGRAM(s) IntraMuscular every 6 hours PRN  losartan 100 milliGRAM(s) Oral daily  methotrexate 2.5 milliGRAM(s) Oral <User Schedule>  sodium chloride 0.9%. 1000 milliLiter(s) IV Continuous <Continuous>      Objective:  Vital Signs Last 24 Hrs  T(C): 36.6 (13 Jul 2023 11:50), Max: 36.6 (13 Jul 2023 11:50)  T(F): 97.8 (13 Jul 2023 11:50), Max: 97.8 (13 Jul 2023 11:50)  HR: 86 (13 Jul 2023 11:50) (56 - 86)  BP: 109/64 (13 Jul 2023 11:50) (109/64 - 161/76)  BP(mean): --  RR: 18 (13 Jul 2023 11:50) (18 - 19)  SpO2: 98% (13 Jul 2023 11:50) (96% - 98%)    Parameters below as of 13 Jul 2023 11:50  Patient On (Oxygen Delivery Method): room air        T(C): 36.6 (07-13-23 @ 11:50), Max: 36.7 (07-11-23 @ 14:49)  T(C): 36.6 (07-13-23 @ 11:50), Max: 37.2 (07-11-23 @ 05:15)  T(C): 36.6 (07-13-23 @ 11:50), Max: 37.2 (07-11-23 @ 05:15)    PHYSICAL EXAM:  HEENT: NC atraumatic  Neck: supple  Respiratory: no accessory muscle use, breathing comfortably  Cardiovascular: distant  Gastrointestinal: normal appearing, nondistended  Extremities: no clubbing, no cyanosis,        LABS:        WBC  9.90 07-10 @ 06:28  10.65 07-09 @ 08:20  14.53 07-08 @ 19:15              Creatinine: 0.87 mg/dL (07-10-23 @ 06:28)  Creatinine: 1.10 mg/dL (07-09-23 @ 08:20)  Creatinine: 1.30 mg/dL (07-08-23 @ 19:15)                INFLAMMATORY MARKERS      MICROBIOLOGY:              RADIOLOGY & ADDITIONAL STUDIES:  
Patient is a 83y old  Female who presents with a chief complaint of fever/UTI (11 Jul 2023 11:52)        INTERVAL HPI/OVERNIGHT EVENTS:   confused  pt seen and examined         Vital Signs Last 24 Hrs  T(C): 37.2 (11 Jul 2023 05:15), Max: 37.2 (11 Jul 2023 05:15)  T(F): 98.9 (11 Jul 2023 05:15), Max: 98.9 (11 Jul 2023 05:15)  HR: 70 (11 Jul 2023 05:15) (70 - 71)  BP: 163/75 (11 Jul 2023 05:15) (163/75 - 179/71)  BP(mean): --  RR: 19 (11 Jul 2023 05:15) (19 - 21)  SpO2: 97% (11 Jul 2023 05:15) (97% - 99%)    Parameters below as of 11 Jul 2023 05:15  Patient On (Oxygen Delivery Method): room air        acetaminophen     Tablet .. 650 milliGRAM(s) Oral every 6 hours PRN  cefTRIAXone   IVPB 1000 milliGRAM(s) IV Intermittent every 24 hours  diclofenac sodium 1% Gel 4 Gram(s) Topical two times a day PRN  enoxaparin Injectable 40 milliGRAM(s) SubCutaneous every 24 hours  losartan 100 milliGRAM(s) Oral daily  methotrexate 2.5 milliGRAM(s) Oral <User Schedule>  potassium chloride    Tablet ER 20 milliEquivalent(s) Oral once  sodium chloride 0.9%. 1000 milliLiter(s) IV Continuous <Continuous>      PHYSICAL EXAM:  GENERAL: NAD   EYES: conjunctiva and sclera clear  ENMT: Moist mucous membranes  NECK: Supple, No JVD, Normal thyroid  CHEST/LUNG: non labored, cta b/l  HEART: Regular rate and rhythm; No murmurs, rubs, or gallops  ABDOMEN: Soft, Nontender, Nondistended; Bowel sounds present  EXTREMITIES:  2+ Peripheral Pulses, No clubbing, cyanosis, or edema  LYMPH: No lymphadenopathy noted  SKIN: No rashes or lesions    Consultant(s) Notes Reviewed:  [x ] YES  [ ] NO  Care Discussed with Consultants/Other Providers [ x] YES  [ ] NO    LABS:                        10.1   9.90  )-----------( 204      ( 10 Jul 2023 06:28 )             30.3     07-10    141  |  108  |  23  ----------------------------<  79  3.4<L>   |  25  |  0.87    Ca    8.5      10 Jul 2023 06:28        Urinalysis Basic - ( 10 Jul 2023 06:28 )    Color: x / Appearance: x / SG: x / pH: x  Gluc: 79 mg/dL / Ketone: x  / Bili: x / Urobili: x   Blood: x / Protein: x / Nitrite: x   Leuk Esterase: x / RBC: x / WBC x   Sq Epi: x / Non Sq Epi: x / Bacteria: x      CAPILLARY BLOOD GLUCOSE            Urinalysis Basic - ( 10 Jul 2023 06:28 )    Color: x / Appearance: x / SG: x / pH: x  Gluc: 79 mg/dL / Ketone: x  / Bili: x / Urobili: x   Blood: x / Protein: x / Nitrite: x   Leuk Esterase: x / RBC: x / WBC x   Sq Epi: x / Non Sq Epi: x / Bacteria: x        Culture - Urine (collected 08 Jul 2023 21:45)  Source: Clean Catch Clean Catch (Midstream)  Preliminary Report (11 Jul 2023 13:03):    50,000 - 99,000 CFU/mL Klebsiella pneumoniae    Culture - Blood (collected 08 Jul 2023 19:10)  Source: .Blood Blood-Peripheral  Preliminary Report (10 Jul 2023 23:02):    No growth at 48 Hours    Culture - Blood (collected 08 Jul 2023 19:00)  Source: .Blood Blood-Peripheral  Preliminary Report (10 Jul 2023 23:01):    No growth at 48 Hours        RADIOLOGY & ADDITIONAL TESTS:    Imaging Personally Reviewed  Reviewed consultants input
Patient is a 83y old  Female who presents with a chief complaint of fever/UTI (13 Jul 2023 12:58)      INTERVAL HPI/OVERNIGHT EVENTS: noted  pt seen and examined this am   events noted  oob to chair, improved mS, well oriented      Vital Signs Last 24 Hrs  T(C): 36.6 (13 Jul 2023 11:50), Max: 36.6 (13 Jul 2023 11:50)  T(F): 97.8 (13 Jul 2023 11:50), Max: 97.8 (13 Jul 2023 11:50)  HR: 86 (13 Jul 2023 11:50) (56 - 86)  BP: 109/64 (13 Jul 2023 11:50) (109/64 - 144/77)  BP(mean): --  RR: 18 (13 Jul 2023 11:50) (18 - 19)  SpO2: 98% (13 Jul 2023 11:50) (96% - 98%)    Parameters below as of 13 Jul 2023 11:50  Patient On (Oxygen Delivery Method): room air        acetaminophen     Tablet .. 650 milliGRAM(s) Oral every 6 hours PRN  diclofenac sodium 1% Gel 4 Gram(s) Topical two times a day PRN  enoxaparin Injectable 40 milliGRAM(s) SubCutaneous every 24 hours  haloperidol    Injectable 1 milliGRAM(s) IntraMuscular every 6 hours PRN  losartan 100 milliGRAM(s) Oral daily  methotrexate 2.5 milliGRAM(s) Oral <User Schedule>  sodium chloride 0.9%. 1000 milliLiter(s) IV Continuous <Continuous>  trimethoprim  160 mG/sulfamethoxazole 800 mG 1 Tablet(s) Oral two times a day      PHYSICAL EXAM:  GENERAL: NAD,   EYES: conjunctiva and sclera clear  ENMT: Moist mucous membranes  NECK: Supple, No JVD, Normal thyroid  CHEST/LUNG: non labored, cta b/l  HEART: Regular rate and rhythm; No murmurs, rubs, or gallops  ABDOMEN: Soft, Nontender, Nondistended; Bowel sounds present  EXTREMITIES:  2+ Peripheral Pulses, No clubbing, cyanosis, or edema  LYMPH: No lymphadenopathy noted  SKIN: No rashes or lesions    Consultant(s) Notes Reviewed:  [x ] YES  [ ] NO  Care Discussed with Consultants/Other Providers [ x] YES  [ ] NO    LABS:              CAPILLARY BLOOD GLUCOSE                  RADIOLOGY & ADDITIONAL TESTS:    Imaging Personally Reviewed:  [x ] YES  [ ] NO

## 2023-07-14 NOTE — PROGRESS NOTE ADULT - PROVIDER SPECIALTY LIST ADULT
Hospitalist
Infectious Disease
Infectious Disease
Internal Medicine
Infectious Disease
Infectious Disease
Hospitalist
Hospitalist
Infectious Disease
Internal Medicine

## 2023-07-14 NOTE — DISCHARGE NOTE NURSING/CASE MANAGEMENT/SOCIAL WORK - PATIENT PORTAL LINK FT
You can access the FollowMyHealth Patient Portal offered by [facility] by registering at the following website: [email or web address]. By joining [employer]’s FollowMyHealth portal, you will also be able to view your health information using other applications (apps) compatible with our system.

## 2023-07-14 NOTE — SOCIAL WORK PROGRESS NOTE - NSSWPROGRESSNOTE_GEN_ALL_CORE
Lachelle were faxed out to facilities of family choice. Pt has been medically accepted to [facility] rehab and a bed is available for today. Pt and family agree to accept subacute rehab bed at [facility]. Ambulance arranged for 1:30 pm and all paperwork completed including screen. family and facility aware of ambulance pickup time via [facility]. Sw will remain available.

## 2023-07-14 NOTE — DISCHARGE NOTE PROVIDER - HOSPITAL COURSE
83y female with PMHx of RA, HTN, spinal cord injury s/p cervical fusion (2000), OA admitted with UTI  sp course of abx, cliniclaly improved  metabolic encephalopathy/delirium- improved during the hospital course  dced to rehab

## 2023-07-14 NOTE — PROGRESS NOTE ADULT - ASSESSMENT
83y female with PMHx of RA, HTN, spinal cord injury s/p cervical fusion (2000), OA admitted with UTI  f/u ucx  ID following  continue abx    RA  continue MTX and diclofenac    HTN  continue losartan    DTI  woundcare     delirium  toxic metabolic encephalopathy due to uti
83y female with PMHx of RA, HTN, spinal cord injury s/p cervical fusion (2000), OA presented to the ED for the evaluation of fever urinary frequency and nausea  leukocytosis, uti, ua w pyuria  ct abd/pelvis -- 4 nonobstructing calculi -- right side with hydronephrosis    Impression: PYELONEPHRITIS    RECOMMENDATIONS    continue ceftriaxone (started 7/8 evening)  urine culture w 50,000 - 99,000 CFU/mL Gram Negative Rods  blood cultures NGTD    Thank you for consulting us and involving us in the management of this most interesting and challenging case.  We will follow along in the care of this patient. Please call us at [phone] or text me directly on my cell# at [phone] with any concerns.    
83y female with PMHx of RA, HTN, spinal cord injury s/p cervical fusion (2000), OA admitted with UTI  f/u ucx  ID consulted  continue abx    RA  continue MTX and diclofenac    HTN  continue losartan    DTI  woundcare consult
83y female with PMHx of RA, HTN, spinal cord injury s/p cervical fusion (2000), OA presented to the ED for the evaluation of fever urinary frequency and nausea  leukocytosis, uti, ua w pyuria  ct abd/pelvis -- 4 nonobstructing calculi -- right side with hydronephrosis    Impression: PYELONEPHRITIS    RECOMMENDATIONS    continue ceftriaxone (started 7/8 evening)  urine in lab but urine culture result pending  blood cultures NGTD    Thank you for consulting us and involving us in the management of this most interesting and challenging case.  We will follow along in the care of this patient. Please call us at [phone] or text me directly on my cell# at [phone] with any concerns.    
83y female with PMHx of RA, HTN, spinal cord injury s/p cervical fusion (2000), OA presented to the ED for the evaluation of fever urinary frequency and nausea  leukocytosis, uti, ua w pyuria  ct abd/pelvis -- 4 nonobstructing calculi -- right side with hydronephrosis    Impression: PYELONEPHRITIS    RECOMMENDATIONS    urine culture w 50,000 - 99,000 CFU/mL Gram Negative Rods  blood cultures NGTD     ceftriaxone (started 7/8 evening)  changed to BACTRIM DS 1-tab PO BID with last day 7/17    fine to look at dc planning per ID issues    Thank you for consulting us and involving us in the management of this most interesting and challenging case.  We will follow along in the care of this patient. Please call us at [phone] or text me directly on my cell# at [phone] with any concerns.    
83y female with PMHx of RA, HTN, spinal cord injury s/p cervical fusion (2000), OA presented to the ED for the evaluation of fever urinary frequency and nausea  leukocytosis, uti, ua w pyuria  ct abd/pelvis -- 4 nonobstructing calculi -- right side with hydronephrosis    Impression: PYELONEPHRITIS    RECOMMENDATIONS    urine culture w 50,000 - 99,000 CFU/mL Gram Negative Rods  blood cultures NGTD     ceftriaxone (started 7/8 evening)  changed to BACTRIM DS 1-tab PO BID with last day 7/17    fine to look at dc planning per ID issues    Thank you for consulting us and involving us in the management of this most interesting and challenging case.  We will follow along in the care of this patient. Please call us at [phone] or text me directly on my cell# at [phone] with any concerns.    
83y female with PMHx of RA, HTN, spinal cord injury s/p cervical fusion (2000), OA admitted with UTI  f/u ucx  ID consult noted  continue abx    RA  continue MTX and diclofenac    HTN  continue losartan    DTI  woundcare consult    #delirium- monitor  empiric haldol prn   dgtr concerned- obtain psych cs  dw dgtr at bedside at length plan of care    [facility]   [phone]    
83y female with PMHx of RA, HTN, spinal cord injury s/p cervical fusion (2000), OA admitted with UTI  f/u ucx  ID consult noted  continue abx    RA  continue MTX and diclofenac    HTN  continue losartan    DTI  woundcare consult    #delirium- monitor- improved  empiric haldol prn     dc planning to rehab in progress    [facility]   [phone]    
83y female with PMHx of RA, HTN, spinal cord injury s/p cervical fusion (2000), OA presented to the ED for the evaluation of fever urinary frequency and nausea  leukocytosis, uti, ua w pyuria  ct abd/pelvis -- 4 nonobstructing calculi -- right side with hydronephrosis    Impression: PYELONEPHRITIS    RECOMMENDATIONS    urine culture w 50,000 - 99,000 CFU/mL Gram Negative Rods  blood cultures NGTD     ceftriaxone (started 7/8 evening) will change to BACTRIM DS 1-tab PO BID with last day 7/17    Thank you for consulting us and involving us in the management of this most interesting and challenging case.  We will follow along in the care of this patient. Please call us at [phone] or text me directly on my cell# at [phone] with any concerns.    
83y female with PMHx of RA, HTN, spinal cord injury s/p cervical fusion (2000), OA admitted with UTI  f/u ucx  ID consulted  continue abx    RA  continue MTX and diclofenac    HTN  continue losartan    DTI  woundcare consult

## 2023-07-14 NOTE — DISCHARGE NOTE NURSING/CASE MANAGEMENT/SOCIAL WORK - NSDCPEFALRISK_GEN_ALL_CORE
For information on Fall & Injury Prevention, visit: [email or web address] OR  [email or web address] OR  https://www.cdc.gov/steadi/patient.html

## 2024-03-14 NOTE — ED PROVIDER NOTE - IV ALTEPLASE EXCL REL HIDDEN
Patient c/o 8/10 left side abdomen pain requested toradol. Pt was given prn toradol 15mg IVP via new IV right FA #[phone]. No further needs noted, call light is within reach.   show

## 2025-02-25 NOTE — DISCHARGE NOTE PROVIDER - DISCHARGE DIET
Rx for APAP and clinicals sent to [facility] via [facility].   DASH Diet

## 2025-05-08 NOTE — ED PROVIDER NOTE - CADM POA URETHRAL CATHETER
Quality 47: Advance Care Plan: Advance Care Planning discussed and documented; advance care plan or surrogate decision maker documented in the medical record. Quality 226: Preventive Care And Screening: Tobacco Use: Screening And Cessation Intervention: Patient screened for tobacco use and is an ex/non-smoker Quality 130: Documentation Of Current Medications In The Medical Record: Current Medications Documented Detail Level: Detailed No